# Patient Record
Sex: MALE | Race: WHITE | NOT HISPANIC OR LATINO | Employment: STUDENT | URBAN - METROPOLITAN AREA
[De-identification: names, ages, dates, MRNs, and addresses within clinical notes are randomized per-mention and may not be internally consistent; named-entity substitution may affect disease eponyms.]

---

## 2023-09-13 ENCOUNTER — HOSPITAL ENCOUNTER (OUTPATIENT)
Dept: DATA CONVERSION | Facility: HOSPITAL | Age: 18
Discharge: HOME | End: 2023-09-13
Payer: COMMERCIAL

## 2023-09-13 DIAGNOSIS — S49.90XA UNSPECIFIED INJURY OF SHOULDER AND UPPER ARM, UNSPECIFIED ARM, INITIAL ENCOUNTER: ICD-10-CM

## 2023-09-13 DIAGNOSIS — Z01.818 ENCOUNTER FOR OTHER PREPROCEDURAL EXAMINATION: ICD-10-CM

## 2023-10-05 PROBLEM — R59.9 GLANDS SWOLLEN: Status: ACTIVE | Noted: 2021-10-25

## 2023-10-05 PROBLEM — L23.7 POISON IVY DERMATITIS: Status: ACTIVE | Noted: 2022-10-18

## 2023-10-05 PROBLEM — H61.21 EXCESSIVE CERUMEN IN RIGHT EAR CANAL: Status: ACTIVE | Noted: 2021-05-25

## 2023-10-05 PROBLEM — H10.9 CONJUNCTIVITIS: Status: ACTIVE | Noted: 2021-10-25

## 2023-10-05 PROBLEM — J02.9 SORE THROAT: Status: ACTIVE | Noted: 2023-10-05

## 2023-10-05 PROBLEM — H61.20 EXCESS WAX IN EAR: Status: ACTIVE | Noted: 2021-05-24

## 2023-10-05 PROBLEM — R00.0 TACHYCARDIA: Status: ACTIVE | Noted: 2023-04-06

## 2023-10-05 PROBLEM — F43.22 ADJUSTMENT REACTION WITH ANXIETY: Status: ACTIVE | Noted: 2020-10-19

## 2023-10-05 PROBLEM — J34.2 DEVIATED SEPTUM: Status: ACTIVE | Noted: 2023-10-05

## 2023-10-05 PROBLEM — R05.3 PERSISTENT COUGH: Status: ACTIVE | Noted: 2022-02-07

## 2023-10-05 PROBLEM — J34.89 SINUS PAIN: Status: ACTIVE | Noted: 2023-04-24

## 2023-10-05 PROBLEM — H92.01 EAR PAIN, RIGHT: Status: ACTIVE | Noted: 2021-05-24

## 2023-10-05 PROBLEM — H61.23 BILATERAL IMPACTED CERUMEN: Status: ACTIVE | Noted: 2021-06-15

## 2023-10-05 PROBLEM — R06.2 WHEEZING: Status: ACTIVE | Noted: 2021-12-28

## 2023-10-05 RX ORDER — FINASTERIDE 1 MG/1
1 TABLET, FILM COATED ORAL DAILY
COMMUNITY

## 2023-10-05 RX ORDER — MINOXIDIL 2.5 MG/1
TABLET ORAL
COMMUNITY
Start: 2023-08-03 | End: 2023-10-25 | Stop reason: ALTCHOICE

## 2023-10-11 ENCOUNTER — APPOINTMENT (OUTPATIENT)
Dept: PRIMARY CARE | Facility: CLINIC | Age: 18
End: 2023-10-11
Payer: COMMERCIAL

## 2023-10-25 ENCOUNTER — OFFICE VISIT (OUTPATIENT)
Dept: PEDIATRICS | Facility: CLINIC | Age: 18
End: 2023-10-25
Payer: COMMERCIAL

## 2023-10-25 VITALS
WEIGHT: 151 LBS | DIASTOLIC BLOOD PRESSURE: 70 MMHG | HEIGHT: 69 IN | BODY MASS INDEX: 22.36 KG/M2 | SYSTOLIC BLOOD PRESSURE: 112 MMHG

## 2023-10-25 DIAGNOSIS — R53.83 OTHER FATIGUE: Primary | ICD-10-CM

## 2023-10-25 DIAGNOSIS — Z13.31 DEPRESSION SCREEN: ICD-10-CM

## 2023-10-25 PROCEDURE — 1036F TOBACCO NON-USER: CPT | Performed by: PEDIATRICS

## 2023-10-25 PROCEDURE — 96127 BRIEF EMOTIONAL/BEHAV ASSMT: CPT | Performed by: PEDIATRICS

## 2023-10-25 PROCEDURE — 99395 PREV VISIT EST AGE 18-39: CPT | Performed by: PEDIATRICS

## 2023-10-25 RX ORDER — MINOXIDIL 2 %
SOLUTION, NON-ORAL TOPICAL 2 TIMES DAILY
COMMUNITY

## 2023-10-25 ASSESSMENT — ENCOUNTER SYMPTOMS
CONSTIPATION: 0
DIARRHEA: 0
SLEEP DISTURBANCE: 0

## 2023-10-25 NOTE — PROGRESS NOTES
Subjective   History was provided by the  self .  Kuldip Landis is a 18 y.o. male who is here for this well child visit.  Immunization History   Administered Date(s) Administered    DTaP vaccine, pediatric  (INFANRIX) 2003, 01/08/2004, 03/18/2004, 12/09/2004, 09/10/2008    Hepatitis A vaccine, pediatric/adolescent (HAVRIX, VAQTA) 08/14/2013, 08/06/2014    Hepatitis B vaccine, pediatric/adolescent (RECOMBIVAX, ENGERIX) 2003, 03/18/2004, 06/10/2004    HiB PRP-OMP conjugate vaccine, pediatric (PEDVAXHIB) 2003, 01/08/2004, 03/18/2004, 12/09/2004    Influenza Whole 11/02/2004, 11/08/2005    Influenza, Unspecified 12/09/2004, 11/03/2009, 12/16/2009    Influenza, injectable, quadrivalent 10/04/2018    Influenza, seasonal, injectable 10/18/2007, 10/07/2008, 09/24/2009    MMR vaccine, subcutaneous (MMR II) 12/09/2004, 09/10/2008    Meningococcal ACWY vaccine (MENVEO) 07/14/2021    Meningococcal MCV4P 08/12/2016    Novel influenza-H1N1-09, preservative-free 11/03/2009    Pneumococcal Conjugate PCV 7 2003, 01/08/2004, 06/10/2004, 09/16/2004    Poliovirus vaccine, subcutaneous (IPOL) 2003, 01/08/2004, 03/12/2005, 09/10/2008    Tdap vaccine, age 7 year and older (BOOSTRIX) 08/03/2016    Varicella vaccine, subcutaneous (VARIVAX) 09/15/2005, 09/10/2008   Declines vaccines  Well Child Assessment:    Nutrition  Types of intake include cereals, fruits, meats and vegetables.   Dental  The patient has a dental home. The patient brushes teeth regularly.   Elimination  Elimination problems do not include constipation, diarrhea or urinary symptoms.   Sleep  There are no sleep problems.   School  Child is doing well in school.   Sophomore at [employer] doing well, lives at home- commutes  Denies SA discussed safe sex  Denies smoking vaping and marijuana use  Wears seatbelt in the car, discussed bike helmet  Has not needed to see cardiology  On medication for anxiety and doing well  Complaint of fatigue over  "the past couple months  Gets 6-8 hours per sleep  Eats well not a vegetarian  No family history of thyroid disease Has not had mono before      Objective   Vitals:    10/25/23 1359   BP: 112/70   Weight: 68.5 kg (151 lb)   Height: 1.759 m (5' 9.25\")       Growth parameters are noted and are appropriate for age.  Physical Exam  Constitutional:       Appearance: Normal appearance.   HENT:      Right Ear: Tympanic membrane normal.      Left Ear: Tympanic membrane normal.      Nose: Nose normal.      Mouth/Throat:      Mouth: Mucous membranes are moist.      Pharynx: Oropharynx is clear.   Eyes:      Pupils: Pupils are equal, round, and reactive to light.   Cardiovascular:      Rate and Rhythm: Normal rate and regular rhythm.      Heart sounds: No murmur heard.  Pulmonary:      Effort: Pulmonary effort is normal.      Breath sounds: Normal breath sounds.   Abdominal:      General: Abdomen is flat. Bowel sounds are normal.   Genitourinary:     Penis: Normal.       Testes: Normal.      Comments: No hernia  Musculoskeletal:      Cervical back: Neck supple.      Comments: Normal  Spine straight   Skin:     General: Skin is warm.   Neurological:      General: No focal deficit present.      Mental Status: He is alert.      Gait: Gait normal.      Deep Tendon Reflexes: Reflexes normal.   Psychiatric:         Mood and Affect: Mood normal.         Assessment/Plan   Well adolescent.  1. Anticipatory guidance discussed.  2. Patient will get blood drawn for labs to evaluate fatigue. Our office will call with results  Orders Placed This Encounter   Procedures    CBC    Ankit-Barr Virus Antibody Panel    TSH with reflex to Free T4 if abnormal   3. Follow-up visit in 1 year for next well child visit, or sooner as needed.      "

## 2023-10-30 ENCOUNTER — LAB (OUTPATIENT)
Dept: LAB | Facility: LAB | Age: 18
End: 2023-10-30
Payer: COMMERCIAL

## 2023-10-30 DIAGNOSIS — R53.83 OTHER FATIGUE: ICD-10-CM

## 2023-10-30 LAB
ERYTHROCYTE [DISTWIDTH] IN BLOOD BY AUTOMATED COUNT: 12.2 % (ref 11.5–14.5)
HCT VFR BLD AUTO: 46.3 % (ref 41–52)
HGB BLD-MCNC: 15.6 G/DL (ref 13.5–17.5)
MCH RBC QN AUTO: 30.2 PG (ref 26–34)
MCHC RBC AUTO-ENTMCNC: 33.7 G/DL (ref 32–36)
MCV RBC AUTO: 90 FL (ref 80–100)
NRBC BLD-RTO: 0 /100 WBCS (ref 0–0)
PLATELET # BLD AUTO: 301 X10*3/UL (ref 150–450)
PMV BLD AUTO: 9.9 FL (ref 7.5–11.5)
RBC # BLD AUTO: 5.16 X10*6/UL (ref 4.5–5.9)
TSH SERPL-ACNC: 0.85 MIU/L (ref 0.44–3.98)
WBC # BLD AUTO: 8 X10*3/UL (ref 4.4–11.3)

## 2023-10-30 PROCEDURE — 84443 ASSAY THYROID STIM HORMONE: CPT

## 2023-10-30 PROCEDURE — 89240 UNLISTED MISC PATH TEST: CPT | Performed by: PEDIATRICS

## 2023-10-30 PROCEDURE — 85027 COMPLETE CBC AUTOMATED: CPT

## 2023-10-30 PROCEDURE — 86665 EPSTEIN-BARR CAPSID VCA: CPT

## 2023-10-30 PROCEDURE — 36415 COLL VENOUS BLD VENIPUNCTURE: CPT

## 2023-10-30 PROCEDURE — 86663 EPSTEIN-BARR ANTIBODY: CPT

## 2023-10-30 PROCEDURE — 86664 EPSTEIN-BARR NUCLEAR ANTIGEN: CPT

## 2023-10-31 LAB
EBV EA IGG SER QL: NEGATIVE
EBV NA AB SER QL: POSITIVE
EBV VCA IGG SER IA-ACNC: POSITIVE
EBV VCA IGM SER IA-ACNC: ABNORMAL

## 2023-11-01 ENCOUNTER — TELEPHONE (OUTPATIENT)
Dept: PEDIATRICS | Facility: CLINIC | Age: 18
End: 2023-11-01
Payer: COMMERCIAL

## 2023-11-01 NOTE — TELEPHONE ENCOUNTER
Discussed lab results with Kuldip and recommended continuing supportive care, rest and fluids.  Parent understands plan and has no other questions.  FYI and can sign encounter to close.

## 2023-11-02 ENCOUNTER — PHARMACY VISIT (OUTPATIENT)
Dept: PHARMACY | Facility: CLINIC | Age: 18
End: 2023-11-02
Payer: COMMERCIAL

## 2023-11-02 ENCOUNTER — TELEPHONE (OUTPATIENT)
Dept: PRIMARY CARE | Facility: CLINIC | Age: 18
End: 2023-11-02
Payer: COMMERCIAL

## 2023-11-02 DIAGNOSIS — F41.9 ANXIETY: Primary | ICD-10-CM

## 2023-11-02 PROCEDURE — RXMED WILLOW AMBULATORY MEDICATION CHARGE

## 2023-11-02 RX ORDER — ESCITALOPRAM OXALATE 10 MG/1
10 TABLET ORAL DAILY
Qty: 30 TABLET | Refills: 0 | Status: SHIPPED | OUTPATIENT
Start: 2023-11-02 | End: 2023-11-17 | Stop reason: SDUPTHER

## 2023-11-04 LAB — SCAN RESULT: NORMAL

## 2023-11-09 ENCOUNTER — TELEMEDICINE (OUTPATIENT)
Dept: PRIMARY CARE | Facility: CLINIC | Age: 18
End: 2023-11-09
Payer: COMMERCIAL

## 2023-11-09 DIAGNOSIS — R09.89 CHEST CONGESTION: Primary | ICD-10-CM

## 2023-11-09 PROCEDURE — 99442 PR PHYS/QHP TELEPHONE EVALUATION 11-20 MIN: CPT | Performed by: FAMILY MEDICINE

## 2023-11-09 RX ORDER — AZITHROMYCIN 500 MG/1
500 TABLET, FILM COATED ORAL DAILY
Qty: 6 TABLET | Refills: 0 | Status: SHIPPED | OUTPATIENT
Start: 2023-11-09 | End: 2023-11-15

## 2023-11-09 ASSESSMENT — ENCOUNTER SYMPTOMS
CHILLS: 0
HEADACHES: 0
NAUSEA: 0
DIZZINESS: 0
FEVER: 0
SINUS PRESSURE: 1
COUGH: 1

## 2023-11-09 NOTE — PROGRESS NOTES
Subjective   Patient ID: Kuldip Landis is a 18 y.o. male who presents for No chief complaint on file..  HPI  Pt doing a telephone only televisit for chest congestion and low grade fever  no SOB  Sinuses seem fine    Review of Systems   Constitutional:  Negative for chills and fever.   HENT:  Positive for congestion and sinus pressure.    Respiratory:  Positive for cough.    Gastrointestinal:  Negative for nausea.   Neurological:  Negative for dizziness and headaches.       Objective   Physical Exam  No exam/telehealth  Assessment/Plan   Diagnoses and all orders for this visit:  Chest congestion  -     azithromycin (Zithromax) 500 mg tablet; Take 1 tablet (500 mg) by mouth once daily for 5 days.     Pt visit 11 minutes phone only

## 2023-11-10 ENCOUNTER — APPOINTMENT (OUTPATIENT)
Dept: PRIMARY CARE | Facility: CLINIC | Age: 18
End: 2023-11-10
Payer: COMMERCIAL

## 2023-11-17 ENCOUNTER — OFFICE VISIT (OUTPATIENT)
Dept: PRIMARY CARE | Facility: CLINIC | Age: 18
End: 2023-11-17
Payer: COMMERCIAL

## 2023-11-17 VITALS
SYSTOLIC BLOOD PRESSURE: 122 MMHG | BODY MASS INDEX: 22.36 KG/M2 | HEIGHT: 69 IN | WEIGHT: 151 LBS | DIASTOLIC BLOOD PRESSURE: 64 MMHG

## 2023-11-17 DIAGNOSIS — F43.22 ADJUSTMENT REACTION WITH ANXIETY: ICD-10-CM

## 2023-11-17 PROCEDURE — 99213 OFFICE O/P EST LOW 20 MIN: CPT | Performed by: FAMILY MEDICINE

## 2023-11-17 PROCEDURE — 1036F TOBACCO NON-USER: CPT | Performed by: FAMILY MEDICINE

## 2023-11-17 RX ORDER — ESCITALOPRAM OXALATE 10 MG/1
10 TABLET ORAL DAILY
Qty: 90 TABLET | Refills: 1 | Status: SHIPPED | OUTPATIENT
Start: 2023-11-17 | End: 2024-05-23 | Stop reason: SDUPTHER

## 2023-11-17 ASSESSMENT — ENCOUNTER SYMPTOMS
HEADACHES: 0
WEAKNESS: 0
AGITATION: 0
UNEXPECTED WEIGHT CHANGE: 0
SHORTNESS OF BREATH: 0
COUGH: 0
ABDOMINAL PAIN: 0
CHEST TIGHTNESS: 0
ARTHRALGIAS: 0

## 2023-11-17 ASSESSMENT — PATIENT HEALTH QUESTIONNAIRE - PHQ9
2. FEELING DOWN, DEPRESSED OR HOPELESS: NOT AT ALL
1. LITTLE INTEREST OR PLEASURE IN DOING THINGS: NOT AT ALL
SUM OF ALL RESPONSES TO PHQ9 QUESTIONS 1 AND 2: 0

## 2023-11-17 NOTE — PROGRESS NOTES
Patient is here today to recheck anxiety.  He is currently taking Lexapro 10mg daily and needs this refilled.  He states he is doing well on this dose.

## 2023-11-17 NOTE — PROGRESS NOTES
Subjective   Patient ID: Kuldip Landis is a 18 y.o. male who presents for Anxiety.  Anxiety  Patient reports no chest pain or shortness of breath.         Patient is here today to recheck anxiety.  He is currently taking Lexapro 10mg daily and needs this refilled.  He states he is doing well on this dose.  HPI reviewed/agree  Pt generally feels fine  Meds seem ok  really feels confident on the Lexapro    all good   wants to continue same  no questions   Review of Systems   Constitutional:  Negative for unexpected weight change.   Respiratory:  Negative for cough, chest tightness and shortness of breath.    Cardiovascular:  Negative for chest pain.   Gastrointestinal:  Negative for abdominal pain.   Musculoskeletal:  Negative for arthralgias.   Skin:  Negative for rash.   Neurological:  Negative for weakness and headaches.   Psychiatric/Behavioral:  Negative for agitation.        Objective   Physical Exam  Constitutional:       Appearance: Normal appearance.   HENT:      Head: Normocephalic and atraumatic.      Nose: Nose normal.      Mouth/Throat:      Pharynx: Oropharynx is clear.   Eyes:      Extraocular Movements: Extraocular movements intact.      Pupils: Pupils are equal, round, and reactive to light.   Cardiovascular:      Rate and Rhythm: Normal rate and regular rhythm.      Pulses: Normal pulses.      Heart sounds: Normal heart sounds. No murmur heard.  Pulmonary:      Effort: Pulmonary effort is normal.      Breath sounds: Normal breath sounds.   Abdominal:      General: Abdomen is flat. Bowel sounds are normal.      Palpations: Abdomen is soft.      Tenderness: There is no abdominal tenderness.   Musculoskeletal:         General: Normal range of motion.   Skin:     General: Skin is warm and dry.   Neurological:      General: No focal deficit present.      Mental Status: He is alert and oriented to person, place, and time.   Psychiatric:         Mood and Affect: Mood normal.         Behavior: Behavior  normal.         Assessment/Plan   Diagnoses and all orders for this visit:  Adjustment reaction with anxiety  -     escitalopram (Lexapro) 10 mg tablet; Take 1 tablet (10 mg) by mouth once daily.

## 2023-11-23 ENCOUNTER — PHARMACY VISIT (OUTPATIENT)
Dept: PHARMACY | Facility: CLINIC | Age: 18
End: 2023-11-23
Payer: COMMERCIAL

## 2023-12-08 ENCOUNTER — OFFICE VISIT (OUTPATIENT)
Dept: CARDIOLOGY | Facility: CLINIC | Age: 18
End: 2023-12-08
Payer: COMMERCIAL

## 2023-12-08 VITALS
HEART RATE: 77 BPM | OXYGEN SATURATION: 100 % | BODY MASS INDEX: 22.15 KG/M2 | SYSTOLIC BLOOD PRESSURE: 122 MMHG | WEIGHT: 150 LBS | DIASTOLIC BLOOD PRESSURE: 60 MMHG

## 2023-12-08 DIAGNOSIS — I47.19 AVNRT (AV NODAL RE-ENTRY TACHYCARDIA) (CMS-HCC): ICD-10-CM

## 2023-12-08 DIAGNOSIS — R00.0 TACHYCARDIA: Primary | ICD-10-CM

## 2023-12-08 DIAGNOSIS — R00.2 PALPITATIONS: ICD-10-CM

## 2023-12-08 DIAGNOSIS — I47.10 SVT (SUPRAVENTRICULAR TACHYCARDIA) (CMS-HCC): ICD-10-CM

## 2023-12-08 PROCEDURE — 93005 ELECTROCARDIOGRAM TRACING: CPT | Performed by: INTERNAL MEDICINE

## 2023-12-08 PROCEDURE — 99203 OFFICE O/P NEW LOW 30 MIN: CPT | Performed by: INTERNAL MEDICINE

## 2023-12-08 PROCEDURE — 99213 OFFICE O/P EST LOW 20 MIN: CPT | Performed by: INTERNAL MEDICINE

## 2023-12-08 PROCEDURE — 1036F TOBACCO NON-USER: CPT | Performed by: INTERNAL MEDICINE

## 2023-12-08 PROCEDURE — 93010 ELECTROCARDIOGRAM REPORT: CPT | Performed by: INTERNAL MEDICINE

## 2023-12-08 ASSESSMENT — ENCOUNTER SYMPTOMS
FEVER: 0
WEIGHT LOSS: 0
SYNCOPE: 0
WEIGHT GAIN: 0
PND: 0
DYSPNEA ON EXERTION: 0
DIZZINESS: 0
ORTHOPNEA: 0
WHEEZING: 0
CLAUDICATION: 0
IRREGULAR HEARTBEAT: 0
PALPITATIONS: 1
NEAR-SYNCOPE: 0
WEAKNESS: 0
MYALGIAS: 0
SHORTNESS OF BREATH: 0
COUGH: 0
DIAPHORESIS: 0

## 2023-12-08 ASSESSMENT — PAIN SCALES - GENERAL: PAINLEVEL: 0-NO PAIN

## 2023-12-08 NOTE — PROGRESS NOTES
Subjective      Chief Complaint   Patient presents with    Cardiac Eval- History SVT- Tachy        19 yo with h/o anxiety and SVT presents for cardiac evaluation. He has had 2 ablations, last was in 2019 at [facility]. He reports he has been having issues with palpitations over the last year. He notices these a few times a month. These episodes are associated with nausea/vomiting and feelings of distress.     He is moderately active, exercises about 1x/week. He has never had an episode with exertion.          Review of Systems   Constitutional: Negative for diaphoresis, fever, weight gain and weight loss.   Eyes:  Negative for visual disturbance.   Cardiovascular:  Positive for palpitations. Negative for chest pain, claudication, dyspnea on exertion, irregular heartbeat, leg swelling, near-syncope, orthopnea, paroxysmal nocturnal dyspnea and syncope.   Respiratory:  Negative for cough, shortness of breath and wheezing.    Musculoskeletal:  Negative for muscle weakness and myalgias.   Neurological:  Negative for dizziness and weakness.   All other systems reviewed and are negative.       Past Medical History:   Diagnosis Date    Other conditions influencing health status     Seen by counselor    Personal history of diseases of the skin and subcutaneous tissue 07/14/2021    History of hair disorder    Personal history of other diseases of the circulatory system     History of cardiac arrhythmia    Personal history of other diseases of the respiratory system 03/10/2020    History of sore throat    Personal history of other diseases of the respiratory system 02/03/2020    History of influenza    Personal history of other infectious and parasitic diseases 01/24/2020    History of viral infection    Personal history of other specified conditions     History of medical problems    Personal history of other specified conditions 01/21/2020    History of fever    Personal history of other specified conditions 07/30/2021  "   History of epistaxis    Presence of spectacles and contact lenses     Wears glasses        Past Surgical History:   Procedure Laterality Date    OTHER SURGICAL HISTORY  07/30/2021    Cardiac cath His ablation        Social History     Socioeconomic History    Marital status: Single     Spouse name: Not on file    Number of children: Not on file    Years of education: Not on file    Highest education level: Not on file   Occupational History    Not on file   Tobacco Use    Smoking status: Never     Passive exposure: Never    Smokeless tobacco: Never   Vaping Use    Vaping Use: Never used   Substance and Sexual Activity    Alcohol use: Never    Drug use: Never    Sexual activity: Not on file   Other Topics Concern    Not on file   Social History Narrative    Not on file     Social Determinants of Health     Financial Resource Strain: Not on file   Food Insecurity: Not on file   Transportation Needs: Not on file   Physical Activity: Not on file   Stress: Not on file   Social Connections: Not on file   Intimate Partner Violence: Not on file   Housing Stability: Not on file        Family History   Problem Relation Name Age of Onset    No Known Problems Mother      Hypertension Father          OBJECTIVE:    Vitals:    12/08/23 1042   BP: 122/60   Pulse: 77   SpO2: 100%        Physical Exam     Lab Review:   Lab Results   Component Value Date     01/08/2019    K 3.4 (L) 01/08/2019     01/08/2019    CO2 24 01/08/2019    BUN 14 01/08/2019    CREATININE 0.91 01/08/2019    GLUCOSE 99 01/08/2019    CALCIUM 10.5 01/08/2019     No results found for: \"CHOL\", \"TRIG\", \"HDL\", \"LDLDIRECT\"    No results found for: \"LDLCALC\"     SVT (supraventricular tachycardia)  History of AVNRT with 2 ablations. palpitations occur too infrequent for a 2 week event monitor. Suggested [facility] Kunstad[facility] â€“ [employer] or apple watch. Will get an echocardiogram. Will reassess in 6m       "

## 2023-12-08 NOTE — ASSESSMENT & PLAN NOTE
History of AVNRT with 2 ablations. palpitations occur too infrequent for a 2 week event monitor. Suggested [employer] or apple watch. Will get an echocardiogram. Will reassess in 6m

## 2023-12-09 ENCOUNTER — PHARMACY VISIT (OUTPATIENT)
Dept: PHARMACY | Facility: CLINIC | Age: 18
End: 2023-12-09
Payer: COMMERCIAL

## 2023-12-09 PROCEDURE — RXMED WILLOW AMBULATORY MEDICATION CHARGE

## 2023-12-20 ENCOUNTER — APPOINTMENT (OUTPATIENT)
Dept: PRIMARY CARE | Facility: CLINIC | Age: 18
End: 2023-12-20
Payer: COMMERCIAL

## 2023-12-22 ENCOUNTER — HOSPITAL ENCOUNTER (OUTPATIENT)
Dept: CARDIOLOGY | Facility: HOSPITAL | Age: 18
Discharge: HOME | End: 2023-12-22
Payer: COMMERCIAL

## 2023-12-22 DIAGNOSIS — I47.19 AVNRT (AV NODAL RE-ENTRY TACHYCARDIA) (CMS-HCC): ICD-10-CM

## 2023-12-22 DIAGNOSIS — I47.10 SUPRAVENTRICULAR TACHYCARDIA (CMS-HCC): ICD-10-CM

## 2023-12-22 LAB
AORTIC VALVE MEAN GRADIENT: 3
AORTIC VALVE PEAK VELOCITY: 1.09
AV PEAK GRADIENT: 4.8
AVA (PEAK VEL): 2.86
AVA (VTI): 2.63
EJECTION FRACTION APICAL 4 CHAMBER: 62.5
EJECTION FRACTION: 64
LEFT VENTRICLE INTERNAL DIMENSION DIASTOLE: 4.72 (ref 3.5–6)
LEFT VENTRICULAR OUTFLOW TRACT DIAMETER: 2
MITRAL VALVE E/A RATIO: 3.43
MITRAL VALVE E/E' RATIO: 4.61
RIGHT VENTRICLE FREE WALL PEAK S': 13.3
RIGHT VENTRICLE PEAK SYSTOLIC PRESSURE: 15.8
TRICUSPID ANNULAR PLANE SYSTOLIC EXCURSION: 1.5

## 2023-12-22 PROCEDURE — 93306 TTE W/DOPPLER COMPLETE: CPT

## 2023-12-22 PROCEDURE — 93306 TTE W/DOPPLER COMPLETE: CPT | Performed by: INTERNAL MEDICINE

## 2023-12-29 ENCOUNTER — TELEPHONE (OUTPATIENT)
Dept: CARDIOLOGY | Facility: CLINIC | Age: 18
End: 2023-12-29
Payer: COMMERCIAL

## 2024-01-03 NOTE — TELEPHONE ENCOUNTER
Spoke w patient. Discussed echo, normal. He has some rhythm strips from home monitoring he wants to drop off for review

## 2024-02-15 ENCOUNTER — PHARMACY VISIT (OUTPATIENT)
Dept: PHARMACY | Facility: CLINIC | Age: 19
End: 2024-02-15
Payer: COMMERCIAL

## 2024-02-15 PROCEDURE — RXMED WILLOW AMBULATORY MEDICATION CHARGE

## 2024-02-15 RX ORDER — AZITHROMYCIN 250 MG/1
TABLET, FILM COATED ORAL
Qty: 6 TABLET | Refills: 0 | OUTPATIENT
Start: 2024-02-15

## 2024-02-15 RX ORDER — NIRMATRELVIR AND RITONAVIR 300-100 MG
KIT ORAL
Qty: 30 TABLET | Refills: 0 | OUTPATIENT
Start: 2024-02-15

## 2024-03-06 ENCOUNTER — PHARMACY VISIT (OUTPATIENT)
Dept: PHARMACY | Facility: CLINIC | Age: 19
End: 2024-03-06
Payer: COMMERCIAL

## 2024-03-06 PROCEDURE — RXMED WILLOW AMBULATORY MEDICATION CHARGE

## 2024-03-07 ENCOUNTER — TELEPHONE (OUTPATIENT)
Dept: PEDIATRICS | Facility: CLINIC | Age: 19
End: 2024-03-07
Payer: COMMERCIAL

## 2024-03-07 NOTE — TELEPHONE ENCOUNTER
Spoke w mom and discussed that LO does not prescribe SSRIs. Offered a referral to psychiatry. Mom does not wish for his to go to a psychiatrist. Mom will most likely transfer providers.

## 2024-03-07 NOTE — TELEPHONE ENCOUNTER
Msg from mom. He was started on lexapro for 2 years, it is working well for him. Mom wondering if LO would take over prescribing because other doctor retired.

## 2024-05-03 ENCOUNTER — TELEPHONE (OUTPATIENT)
Dept: PEDIATRICS | Facility: CLINIC | Age: 19
End: 2024-05-03

## 2024-05-03 ENCOUNTER — APPOINTMENT (OUTPATIENT)
Dept: PEDIATRICS | Facility: CLINIC | Age: 19
End: 2024-05-03
Payer: COMMERCIAL

## 2024-05-03 DIAGNOSIS — K62.9 RECTAL ABNORMALITY: ICD-10-CM

## 2024-05-03 NOTE — TELEPHONE ENCOUNTER
Kuldip called back and I discussed MOISES's recommendation to cancel the apt here and for him to see GI and why.  He understands plan and I gave him GI's contact information.   to cancel his apt here today.

## 2024-05-03 NOTE — TELEPHONE ENCOUNTER
Pt on LO's schedule today and she recommends pt be triaged as he may need to be referred.      Spoke to Kuldip and he said that for about a month he's been having some issues with his rectum.  He said it feels like his rectum is clenching/cramping on the inside.  Said it happens randomly and mostly when he sits but sometimes when he stands it has happened too.  He can't pinpoint what triggers it or why it's happening.  He drinks maybe one bottle of water daily, and eats some fiber daily.  He said he has at least one soft bm daily and hasn't seen any blood in his stool, in the toilet water or on the toilet paper.  He said his dad has a history of hemorrhoids.  Told him I'd get back to him w/plan.  Please advise.

## 2024-05-13 ENCOUNTER — PHARMACY VISIT (OUTPATIENT)
Dept: PHARMACY | Facility: CLINIC | Age: 19
End: 2024-05-13
Payer: COMMERCIAL

## 2024-05-13 PROCEDURE — RXMED WILLOW AMBULATORY MEDICATION CHARGE

## 2024-05-23 ENCOUNTER — OFFICE VISIT (OUTPATIENT)
Dept: PRIMARY CARE | Facility: CLINIC | Age: 19
End: 2024-05-23
Payer: COMMERCIAL

## 2024-05-23 VITALS
HEIGHT: 69 IN | OXYGEN SATURATION: 99 % | WEIGHT: 148 LBS | HEART RATE: 78 BPM | BODY MASS INDEX: 21.92 KG/M2 | TEMPERATURE: 97.2 F | SYSTOLIC BLOOD PRESSURE: 100 MMHG | DIASTOLIC BLOOD PRESSURE: 64 MMHG | RESPIRATION RATE: 16 BRPM

## 2024-05-23 DIAGNOSIS — F41.9 ANXIETY DISORDER, UNSPECIFIED TYPE: Primary | ICD-10-CM

## 2024-05-23 DIAGNOSIS — F43.21 GRIEF: ICD-10-CM

## 2024-05-23 PROCEDURE — 99213 OFFICE O/P EST LOW 20 MIN: CPT | Performed by: NURSE PRACTITIONER

## 2024-05-23 PROCEDURE — RXMED WILLOW AMBULATORY MEDICATION CHARGE

## 2024-05-23 RX ORDER — ALPRAZOLAM 0.5 MG/1
0.5 TABLET ORAL NIGHTLY PRN
Qty: 10 TABLET | Refills: 0 | Status: SHIPPED | OUTPATIENT
Start: 2024-05-23 | End: 2024-06-22

## 2024-05-23 RX ORDER — ESCITALOPRAM OXALATE 10 MG/1
10 TABLET ORAL DAILY
Qty: 90 TABLET | Refills: 1 | Status: SHIPPED | OUTPATIENT
Start: 2024-05-23 | End: 2024-11-19

## 2024-05-23 ASSESSMENT — LIFESTYLE VARIABLES
HOW OFTEN DO YOU HAVE A DRINK CONTAINING ALCOHOL: NEVER
SKIP TO QUESTIONS 9-10: 1
HOW OFTEN DO YOU HAVE SIX OR MORE DRINKS ON ONE OCCASION: NEVER
AUDIT-C TOTAL SCORE: 0
HOW MANY STANDARD DRINKS CONTAINING ALCOHOL DO YOU HAVE ON A TYPICAL DAY: PATIENT DOES NOT DRINK

## 2024-05-23 ASSESSMENT — PATIENT HEALTH QUESTIONNAIRE - PHQ9
SUM OF ALL RESPONSES TO PHQ9 QUESTIONS 1 AND 2: 0
1. LITTLE INTEREST OR PLEASURE IN DOING THINGS: NOT AT ALL
2. FEELING DOWN, DEPRESSED OR HOPELESS: NOT AT ALL

## 2024-05-23 ASSESSMENT — PAIN SCALES - GENERAL: PAINLEVEL: 0-NO PAIN

## 2024-05-23 NOTE — PROGRESS NOTES
"Subjective   Patient ID: Kuldip Landis is a 18 y.o. male who presents for Anxiety (New pt from Dr Cruz. Pt here for refill on lexapro for anxiety).    Saw dr shay, new pt. For refillonn meds    Anxiety             Review of Systems   Cardiovascular:         Svt   Hematological:         Anxiety   Psychiatric/Behavioral:          Anxiety        Objective   /64   Pulse 78   Temp 36.2 °C (97.2 °F)   Resp 16   Ht 1.753 m (5' 9\")   Wt 67.1 kg (148 lb)   SpO2 99%   BMI 21.86 kg/m²     Physical Exam  Constitutional:       Appearance: Normal appearance.   Neurological:      Mental Status: He is alert and oriented to person, place, and time.   Psychiatric:      Comments: Father just got dignossed with pancreatic cancer, discussed this hs has history of anxiety but this id  cusung new issues, has a suppotive mom. So sad          Assessment/Plan   Problem List Items Addressed This Visit    None         "

## 2024-05-24 NOTE — PATIENT INSTRUCTIONS
Practice explained, please call if we can help. Julia galvin given, so very sorry for you and your mom, discussed xanax

## 2024-06-02 ENCOUNTER — APPOINTMENT (OUTPATIENT)
Dept: CARDIOLOGY | Facility: HOSPITAL | Age: 19
End: 2024-06-02
Payer: COMMERCIAL

## 2024-06-02 ENCOUNTER — HOSPITAL ENCOUNTER (EMERGENCY)
Facility: HOSPITAL | Age: 19
Discharge: HOME | End: 2024-06-02
Attending: EMERGENCY MEDICINE
Payer: COMMERCIAL

## 2024-06-02 VITALS
TEMPERATURE: 99.3 F | BODY MASS INDEX: 22.22 KG/M2 | HEIGHT: 69 IN | WEIGHT: 150 LBS | SYSTOLIC BLOOD PRESSURE: 127 MMHG | HEART RATE: 86 BPM | DIASTOLIC BLOOD PRESSURE: 75 MMHG | OXYGEN SATURATION: 100 % | RESPIRATION RATE: 14 BRPM

## 2024-06-02 DIAGNOSIS — R00.2 PALPITATIONS: Primary | ICD-10-CM

## 2024-06-02 LAB
ALBUMIN SERPL BCP-MCNC: 5 G/DL (ref 3.4–5)
ALP SERPL-CCNC: 72 U/L (ref 33–120)
ALT SERPL W P-5'-P-CCNC: 17 U/L (ref 10–52)
ANION GAP SERPL CALC-SCNC: 13 MMOL/L (ref 10–20)
AST SERPL W P-5'-P-CCNC: 22 U/L (ref 9–39)
BASOPHILS # BLD AUTO: 0.04 X10*3/UL (ref 0–0.1)
BASOPHILS NFR BLD AUTO: 0.5 %
BILIRUB SERPL-MCNC: 0.6 MG/DL (ref 0–1.2)
BUN SERPL-MCNC: 14 MG/DL (ref 6–23)
CALCIUM SERPL-MCNC: 9.9 MG/DL (ref 8.6–10.3)
CHLORIDE SERPL-SCNC: 101 MMOL/L (ref 98–107)
CO2 SERPL-SCNC: 26 MMOL/L (ref 21–32)
CREAT SERPL-MCNC: 1.38 MG/DL (ref 0.5–1.3)
EGFRCR SERPLBLD CKD-EPI 2021: 75 ML/MIN/1.73M*2
EOSINOPHIL # BLD AUTO: 0.17 X10*3/UL (ref 0–0.7)
EOSINOPHIL NFR BLD AUTO: 2 %
ERYTHROCYTE [DISTWIDTH] IN BLOOD BY AUTOMATED COUNT: 12 % (ref 11.5–14.5)
GLUCOSE SERPL-MCNC: 118 MG/DL (ref 74–99)
HCT VFR BLD AUTO: 45.6 % (ref 41–52)
HGB BLD-MCNC: 15.6 G/DL (ref 13.5–17.5)
IMM GRANULOCYTES # BLD AUTO: 0.03 X10*3/UL (ref 0–0.7)
IMM GRANULOCYTES NFR BLD AUTO: 0.4 % (ref 0–0.9)
LYMPHOCYTES # BLD AUTO: 2.65 X10*3/UL (ref 1.2–4.8)
LYMPHOCYTES NFR BLD AUTO: 31.3 %
MAGNESIUM SERPL-MCNC: 2.05 MG/DL (ref 1.6–2.4)
MCH RBC QN AUTO: 30.1 PG (ref 26–34)
MCHC RBC AUTO-ENTMCNC: 34.2 G/DL (ref 32–36)
MCV RBC AUTO: 88 FL (ref 80–100)
MONOCYTES # BLD AUTO: 0.65 X10*3/UL (ref 0.1–1)
MONOCYTES NFR BLD AUTO: 7.7 %
NEUTROPHILS # BLD AUTO: 4.93 X10*3/UL (ref 1.2–7.7)
NEUTROPHILS NFR BLD AUTO: 58.1 %
NRBC BLD-RTO: 0 /100 WBCS (ref 0–0)
PLATELET # BLD AUTO: 289 X10*3/UL (ref 150–450)
POTASSIUM SERPL-SCNC: 3.2 MMOL/L (ref 3.5–5.3)
PROT SERPL-MCNC: 7.9 G/DL (ref 6.4–8.2)
RBC # BLD AUTO: 5.18 X10*6/UL (ref 4.5–5.9)
SODIUM SERPL-SCNC: 137 MMOL/L (ref 136–145)
TSH SERPL-ACNC: 1 MIU/L (ref 0.44–3.98)
WBC # BLD AUTO: 8.5 X10*3/UL (ref 4.4–11.3)

## 2024-06-02 PROCEDURE — 83735 ASSAY OF MAGNESIUM: CPT | Performed by: EMERGENCY MEDICINE

## 2024-06-02 PROCEDURE — 99283 EMERGENCY DEPT VISIT LOW MDM: CPT | Mod: 25

## 2024-06-02 PROCEDURE — 2500000004 HC RX 250 GENERAL PHARMACY W/ HCPCS (ALT 636 FOR OP/ED): Performed by: EMERGENCY MEDICINE

## 2024-06-02 PROCEDURE — 84443 ASSAY THYROID STIM HORMONE: CPT | Performed by: EMERGENCY MEDICINE

## 2024-06-02 PROCEDURE — 82947 ASSAY GLUCOSE BLOOD QUANT: CPT | Performed by: EMERGENCY MEDICINE

## 2024-06-02 PROCEDURE — 2500000001 HC RX 250 WO HCPCS SELF ADMINISTERED DRUGS (ALT 637 FOR MEDICARE OP): Performed by: EMERGENCY MEDICINE

## 2024-06-02 PROCEDURE — 36415 COLL VENOUS BLD VENIPUNCTURE: CPT | Performed by: EMERGENCY MEDICINE

## 2024-06-02 PROCEDURE — 96360 HYDRATION IV INFUSION INIT: CPT

## 2024-06-02 PROCEDURE — 85025 COMPLETE CBC W/AUTO DIFF WBC: CPT | Performed by: EMERGENCY MEDICINE

## 2024-06-02 PROCEDURE — 93005 ELECTROCARDIOGRAM TRACING: CPT

## 2024-06-02 RX ORDER — POTASSIUM CHLORIDE 1.5 G/1.58G
20 POWDER, FOR SOLUTION ORAL ONCE
Status: COMPLETED | OUTPATIENT
Start: 2024-06-02 | End: 2024-06-02

## 2024-06-02 RX ADMIN — SODIUM CHLORIDE, POTASSIUM CHLORIDE, SODIUM LACTATE AND CALCIUM CHLORIDE 1000 ML: 600; 310; 30; 20 INJECTION, SOLUTION INTRAVENOUS at 21:25

## 2024-06-02 RX ADMIN — POTASSIUM CHLORIDE 20 MEQ: 1.5 POWDER, FOR SOLUTION ORAL at 22:25

## 2024-06-02 ASSESSMENT — LIFESTYLE VARIABLES
TOTAL SCORE: 0
HAVE PEOPLE ANNOYED YOU BY CRITICIZING YOUR DRINKING: NO
EVER HAD A DRINK FIRST THING IN THE MORNING TO STEADY YOUR NERVES TO GET RID OF A HANGOVER: NO
HAVE YOU EVER FELT YOU SHOULD CUT DOWN ON YOUR DRINKING: NO
EVER FELT BAD OR GUILTY ABOUT YOUR DRINKING: NO

## 2024-06-02 ASSESSMENT — PAIN SCALES - GENERAL
PAINLEVEL_OUTOF10: 0 - NO PAIN

## 2024-06-02 ASSESSMENT — PAIN - FUNCTIONAL ASSESSMENT: PAIN_FUNCTIONAL_ASSESSMENT: 0-10

## 2024-06-02 ASSESSMENT — PAIN DESCRIPTION - PROGRESSION: CLINICAL_PROGRESSION: NOT CHANGED

## 2024-06-03 ENCOUNTER — PHARMACY VISIT (OUTPATIENT)
Dept: PHARMACY | Facility: CLINIC | Age: 19
End: 2024-06-03
Payer: COMMERCIAL

## 2024-06-03 NOTE — ED NOTES
Pt came into the ED tonight due to he has been having a fast HR.  He has a HX of SVT and he is anxious that this may be happening again-  He said his first time was when he was 6 years old and then when he was 11-  He has had two ablations done in the past.      Filomena Sanchez RN  06/02/24 0739

## 2024-06-03 NOTE — ED PROVIDER NOTES
HPI   Chief Complaint   Patient presents with    Irregular Heart Beat     Hx of SVT       18-year-old male with a reported history of SVT status post ablation presents to the ED for palpitations.  Symptoms started today.  He does report recent stress in life.  He works as a  as well.  He reports working a lot and in the sun recently.  He believes that he has been well-hydrated.  He was feeling irregular beats we prompted him to present to a fire station and EMS brought him.                           Davey Coma Scale Score: 15                     Patient History   Past Medical History:   Diagnosis Date    Other conditions influencing health status     Seen by counselor    Personal history of diseases of the skin and subcutaneous tissue 07/14/2021    History of hair disorder    Personal history of other diseases of the circulatory system     History of cardiac arrhythmia    Personal history of other diseases of the respiratory system 03/10/2020    History of sore throat    Personal history of other diseases of the respiratory system 02/03/2020    History of influenza    Personal history of other infectious and parasitic diseases 01/24/2020    History of viral infection    Personal history of other specified conditions     History of medical problems    Personal history of other specified conditions 01/21/2020    History of fever    Personal history of other specified conditions 07/30/2021    History of epistaxis    Presence of spectacles and contact lenses     Wears glasses     Past Surgical History:   Procedure Laterality Date    OTHER SURGICAL HISTORY  07/30/2021    Cardiac cath His ablation     Family History   Problem Relation Name Age of Onset    No Known Problems Mother 65     Hypertension Father 86      Social History     Tobacco Use    Smoking status: Never     Passive exposure: Never    Smokeless tobacco: Never   Vaping Use    Vaping status: Never Used   Substance Use Topics    Alcohol  use: Never    Drug use: Never       Physical Exam   ED Triage Vitals [06/02/24 2112]   Temperature Heart Rate Respirations BP   36.7 °C (98.1 °F) (!) 110 18 141/78      Pulse Ox Temp Source Heart Rate Source Patient Position   100 % Temporal Monitor Lying      BP Location FiO2 (%)     Right arm --       Physical Exam  Vitals and nursing note reviewed.   Constitutional:       Appearance: Normal appearance.   HENT:      Head: Normocephalic and atraumatic.   Eyes:      Extraocular Movements: Extraocular movements intact.      Pupils: Pupils are equal, round, and reactive to light.   Cardiovascular:      Rate and Rhythm: Normal rate and regular rhythm.   Pulmonary:      Effort: Pulmonary effort is normal.      Breath sounds: Normal breath sounds.   Abdominal:      General: Abdomen is flat.      Palpations: Abdomen is soft.   Musculoskeletal:         General: Normal range of motion.      Cervical back: Normal range of motion and neck supple.   Skin:     General: Skin is warm and dry.   Neurological:      General: No focal deficit present.      Mental Status: He is alert and oriented to person, place, and time.   Psychiatric:         Mood and Affect: Mood normal.         Behavior: Behavior normal.         Thought Content: Thought content normal.         Judgment: Judgment normal.         ED Course & MDM   Diagnoses as of 06/02/24 2238   Palpitations       Medical Decision Making  EKG:  Sinus rhythm heart rate 108.  No ST segment elevations or depressions.  Normal intervals normal axis.  No prior EKG compare.    18-year-old male presents to the for palpitations.  Has remote history of SVT status post ablations.  Upon arrival to the ED he is in no acute distress.  He is tachycardic.  Is a sinus tachycardia.  Obtained IV access and the patient hydrated with IV fluids.  Also obtain electrolytes.  Mildly elevated creatinine.  Mildly low potassium.  He was given potassium orally.  Magnesium within normal limits.  After IV  hydration he felt improvement in symptoms.  I suspect dehydration.  He has close follow-up with cardiology which I encouraged him to keep.  All questions were answered.        Procedure  Procedures     Alberto Mcguire MD  06/02/24 3681

## 2024-06-09 LAB
ATRIAL RATE: 108 BPM
P AXIS: 81 DEGREES
P OFFSET: 203 MS
P ONSET: 148 MS
PR INTERVAL: 146 MS
Q ONSET: 221 MS
QRS COUNT: 18 BEATS
QRS DURATION: 82 MS
QT INTERVAL: 308 MS
QTC CALCULATION(BAZETT): 412 MS
QTC FREDERICIA: 374 MS
R AXIS: 76 DEGREES
T AXIS: 62 DEGREES
T OFFSET: 375 MS
VENTRICULAR RATE: 108 BPM

## 2024-06-13 ENCOUNTER — OFFICE VISIT (OUTPATIENT)
Dept: CARDIOLOGY | Facility: CLINIC | Age: 19
End: 2024-06-13
Payer: COMMERCIAL

## 2024-06-13 VITALS
OXYGEN SATURATION: 99 % | WEIGHT: 150 LBS | HEART RATE: 87 BPM | BODY MASS INDEX: 22.15 KG/M2 | SYSTOLIC BLOOD PRESSURE: 110 MMHG | DIASTOLIC BLOOD PRESSURE: 62 MMHG

## 2024-06-13 DIAGNOSIS — E86.0 DEHYDRATION: ICD-10-CM

## 2024-06-13 DIAGNOSIS — R00.2 PALPITATIONS: ICD-10-CM

## 2024-06-13 DIAGNOSIS — R00.0 TACHYCARDIA: ICD-10-CM

## 2024-06-13 DIAGNOSIS — I47.10 SVT (SUPRAVENTRICULAR TACHYCARDIA) (CMS-HCC): Primary | ICD-10-CM

## 2024-06-13 PROCEDURE — 1036F TOBACCO NON-USER: CPT | Performed by: INTERNAL MEDICINE

## 2024-06-13 PROCEDURE — 99213 OFFICE O/P EST LOW 20 MIN: CPT | Performed by: INTERNAL MEDICINE

## 2024-06-13 ASSESSMENT — ENCOUNTER SYMPTOMS
SHORTNESS OF BREATH: 0
WEIGHT GAIN: 0
DEPRESSION: 0
WEAKNESS: 0
FEVER: 0
MYALGIAS: 0
IRREGULAR HEARTBEAT: 0
LOSS OF SENSATION IN FEET: 0
PND: 0
WEIGHT LOSS: 0
DIAPHORESIS: 0
DYSPNEA ON EXERTION: 0
WHEEZING: 0
OCCASIONAL FEELINGS OF UNSTEADINESS: 0
COUGH: 0
CLAUDICATION: 0
PALPITATIONS: 0
NEAR-SYNCOPE: 0
DIZZINESS: 0
SYNCOPE: 0
ORTHOPNEA: 0

## 2024-06-13 ASSESSMENT — PAIN SCALES - GENERAL: PAINLEVEL: 0-NO PAIN

## 2024-06-13 ASSESSMENT — PATIENT HEALTH QUESTIONNAIRE - PHQ9
2. FEELING DOWN, DEPRESSED OR HOPELESS: NOT AT ALL
SUM OF ALL RESPONSES TO PHQ9 QUESTIONS 1 AND 2: 0
1. LITTLE INTEREST OR PLEASURE IN DOING THINGS: NOT AT ALL

## 2024-06-13 NOTE — PROGRESS NOTES
"Subjective      Chief Complaint   Patient presents with    Follow-up        18-year-old male with history of SVT and 2 ablations in the past with the most recent being in 2019 at [facility].  I saw him in December for a cardiac evaluation he was having episodes of palpitations accompanied by nausea and vomiting as well as feelings of distress.  There was no exertional component to these symptoms.  At the time of his evaluation in December the episodes were too infrequent to place an event monitor.  We did get an echocardiogram which showed normal left ventricular size and function.  We decided to monitor him clinically he is here for a reassessment. He was In the [facility] ED for \"skipped beats\", was given IV fluids for dehydration. He feels much better, is very active without limitations.          Review of Systems   Constitutional: Negative for diaphoresis, fever, weight gain and weight loss.   Eyes:  Negative for visual disturbance.   Cardiovascular:  Negative for chest pain, claudication, dyspnea on exertion, irregular heartbeat, leg swelling, near-syncope, orthopnea, palpitations, paroxysmal nocturnal dyspnea and syncope.   Respiratory:  Negative for cough, shortness of breath and wheezing.    Musculoskeletal:  Negative for muscle weakness and myalgias.   Neurological:  Negative for dizziness and weakness.   All other systems reviewed and are negative.       Past Medical History:   Diagnosis Date    Other conditions influencing health status     Seen by counselor    Personal history of diseases of the skin and subcutaneous tissue 07/14/2021    History of hair disorder    Personal history of other diseases of the circulatory system     History of cardiac arrhythmia    Personal history of other diseases of the respiratory system 03/10/2020    History of sore throat    Personal history of other diseases of the respiratory system 02/03/2020    History of influenza    Personal history of other infectious " and parasitic diseases 01/24/2020    History of viral infection    Personal history of other specified conditions     History of medical problems    Personal history of other specified conditions 01/21/2020    History of fever    Personal history of other specified conditions 07/30/2021    History of epistaxis    Presence of spectacles and contact lenses     Wears glasses        Past Surgical History:   Procedure Laterality Date    OTHER SURGICAL HISTORY  07/30/2021    Cardiac cath His ablation        Social History     Socioeconomic History    Marital status: Single     Spouse name: Not on file    Number of children: Not on file    Years of education: Not on file    Highest education level: Not on file   Occupational History    Not on file   Tobacco Use    Smoking status: Never     Passive exposure: Never    Smokeless tobacco: Never   Vaping Use    Vaping status: Never Used   Substance and Sexual Activity    Alcohol use: Never    Drug use: Never    Sexual activity: Defer   Other Topics Concern    Not on file   Social History Narrative    At [employer], business     Social Determinants of Health     Financial Resource Strain: Not on file   Food Insecurity: Not on file   Transportation Needs: Not on file   Physical Activity: Not on file   Stress: Not on file   Social Connections: Not on file   Intimate Partner Violence: Not on file   Housing Stability: Not on file        Family History   Problem Relation Name Age of Onset    No Known Problems Mother 65     Hypertension Father 86         OBJECTIVE:    Vitals:    06/13/24 1310   BP: 110/62   Pulse: 87   SpO2: 99%        Vitals reviewed.   Constitutional:       Appearance: Normal and healthy appearance. Not in distress.   Pulmonary:      Effort: Pulmonary effort is normal.      Breath sounds: Normal breath sounds.   Cardiovascular:      Normal rate. Regular rhythm. Normal S1. Normal S2.       Murmurs: There is no murmur.      No gallop.  No click.   Pulses:      "Intact distal pulses.   Edema:     Peripheral edema absent.   Skin:     General: Skin is warm and dry.   Neurological:      General: No focal deficit present.          Lab Review:   Lab Results   Component Value Date     06/02/2024    K 3.2 (L) 06/02/2024     06/02/2024    CO2 26 06/02/2024    BUN 14 06/02/2024    CREATININE 1.38 (H) 06/02/2024    GLUCOSE 118 (H) 06/02/2024    CALCIUM 9.9 06/02/2024     No results found for: \"CHOL\", \"TRIG\", \"HDL\", \"LDLDIRECT\"    No results found for: \"LDLCALC\"     Dehydration  Clinical scenario 6/5 and labs support intravascular volume depletion. He was gien IVFs and encouraged to drink more fluids. Will check BMP.     Palpitations  Resolved. ECGs show sinus rhythm with sinus arrhythmia. Discussed again the option for home monitoring. An event monitor would be low yield. RTC PRN       "

## 2024-06-13 NOTE — ASSESSMENT & PLAN NOTE
Clinical scenario 6/5 and labs support intravascular volume depletion. He was gien IVFs and encouraged to drink more fluids. Will check BMP.

## 2024-06-13 NOTE — ASSESSMENT & PLAN NOTE
Resolved. ECGs show sinus rhythm with sinus arrhythmia. Discussed again the option for home monitoring. An event monitor would be low yield. RTC PRN

## 2024-06-20 ENCOUNTER — APPOINTMENT (OUTPATIENT)
Dept: CARDIOLOGY | Facility: CLINIC | Age: 19
End: 2024-06-20
Payer: COMMERCIAL

## 2024-07-06 PROCEDURE — RXMED WILLOW AMBULATORY MEDICATION CHARGE

## 2024-07-08 ENCOUNTER — PHARMACY VISIT (OUTPATIENT)
Dept: PHARMACY | Facility: CLINIC | Age: 19
End: 2024-07-08
Payer: COMMERCIAL

## 2024-07-17 ENCOUNTER — LAB (OUTPATIENT)
Dept: LAB | Facility: LAB | Age: 19
End: 2024-07-17
Payer: COMMERCIAL

## 2024-07-17 DIAGNOSIS — E86.0 DEHYDRATION: ICD-10-CM

## 2024-07-17 LAB
ANION GAP SERPL CALC-SCNC: 13 MMOL/L
BUN SERPL-MCNC: 11 MG/DL (ref 8–25)
CALCIUM SERPL-MCNC: 10 MG/DL (ref 8.5–10.4)
CHLORIDE SERPL-SCNC: 96 MMOL/L (ref 97–107)
CO2 SERPL-SCNC: 26 MMOL/L (ref 24–31)
CREAT SERPL-MCNC: 1.4 MG/DL (ref 0.4–1.6)
EGFRCR SERPLBLD CKD-EPI 2021: 74 ML/MIN/1.73M*2
GLUCOSE SERPL-MCNC: 76 MG/DL (ref 65–99)
POTASSIUM SERPL-SCNC: 4.7 MMOL/L (ref 3.4–5.1)
SODIUM SERPL-SCNC: 135 MMOL/L (ref 133–145)

## 2024-07-17 PROCEDURE — 36415 COLL VENOUS BLD VENIPUNCTURE: CPT

## 2024-07-17 PROCEDURE — 80048 BASIC METABOLIC PNL TOTAL CA: CPT

## 2024-08-27 ENCOUNTER — OFFICE VISIT (OUTPATIENT)
Dept: PRIMARY CARE | Facility: CLINIC | Age: 19
End: 2024-08-27
Payer: COMMERCIAL

## 2024-08-27 ENCOUNTER — PHARMACY VISIT (OUTPATIENT)
Dept: PHARMACY | Facility: CLINIC | Age: 19
End: 2024-08-27
Payer: COMMERCIAL

## 2024-08-27 VITALS
TEMPERATURE: 98.8 F | HEIGHT: 69 IN | RESPIRATION RATE: 16 BRPM | HEART RATE: 120 BPM | WEIGHT: 148.2 LBS | BODY MASS INDEX: 21.95 KG/M2 | OXYGEN SATURATION: 99 % | SYSTOLIC BLOOD PRESSURE: 120 MMHG | DIASTOLIC BLOOD PRESSURE: 70 MMHG

## 2024-08-27 DIAGNOSIS — J01.90 ACUTE NON-RECURRENT SINUSITIS, UNSPECIFIED LOCATION: Primary | ICD-10-CM

## 2024-08-27 DIAGNOSIS — F41.9 ANXIETY DISORDER, UNSPECIFIED TYPE: ICD-10-CM

## 2024-08-27 DIAGNOSIS — H61.20 WAX IN EAR: ICD-10-CM

## 2024-08-27 DIAGNOSIS — F43.21 GRIEF: ICD-10-CM

## 2024-08-27 PROCEDURE — 3008F BODY MASS INDEX DOCD: CPT | Performed by: NURSE PRACTITIONER

## 2024-08-27 PROCEDURE — RXMED WILLOW AMBULATORY MEDICATION CHARGE

## 2024-08-27 PROCEDURE — 99213 OFFICE O/P EST LOW 20 MIN: CPT | Performed by: NURSE PRACTITIONER

## 2024-08-27 RX ORDER — ESCITALOPRAM OXALATE 10 MG/1
10 TABLET ORAL DAILY
Qty: 90 TABLET | Refills: 1 | Status: SHIPPED | OUTPATIENT
Start: 2024-08-27 | End: 2025-02-23

## 2024-08-27 RX ORDER — AMOXICILLIN AND CLAVULANATE POTASSIUM 875; 125 MG/1; MG/1
875 TABLET, FILM COATED ORAL 2 TIMES DAILY
Qty: 20 TABLET | Refills: 0 | Status: SHIPPED | OUTPATIENT
Start: 2024-08-27 | End: 2024-09-06

## 2024-08-27 RX ORDER — BENZONATATE 200 MG/1
200 CAPSULE ORAL 3 TIMES DAILY PRN
Qty: 42 CAPSULE | Refills: 3 | Status: SHIPPED | OUTPATIENT
Start: 2024-08-27 | End: 2025-02-23

## 2024-08-27 ASSESSMENT — LIFESTYLE VARIABLES
HOW MANY STANDARD DRINKS CONTAINING ALCOHOL DO YOU HAVE ON A TYPICAL DAY: PATIENT DOES NOT DRINK
SKIP TO QUESTIONS 9-10: 1
HOW OFTEN DO YOU HAVE SIX OR MORE DRINKS ON ONE OCCASION: NEVER
HOW OFTEN DO YOU HAVE A DRINK CONTAINING ALCOHOL: NEVER
AUDIT-C TOTAL SCORE: 0

## 2024-08-27 ASSESSMENT — PATIENT HEALTH QUESTIONNAIRE - PHQ9
SUM OF ALL RESPONSES TO PHQ9 QUESTIONS 1 AND 2: 0
2. FEELING DOWN, DEPRESSED OR HOPELESS: NOT AT ALL
1. LITTLE INTEREST OR PLEASURE IN DOING THINGS: NOT AT ALL

## 2024-08-27 NOTE — PROGRESS NOTES
"Subjective   Patient ID: Kuldip Landis is a 18 y.o. male who presents for Cough (Pt c/o cough, body aches, chills, fever, drainage in throat \"water feeling in ears\" since Sunday. ).    Pt has been sick since Sunday  cough congestion fever at [facility] started, dad has stage 4 pacreatic cancer meds helpful         Review of Systems   Constitutional:  Positive for fever.   HENT:  Positive for congestion, facial swelling, sinus pressure and sore throat.    Respiratory:  Positive for cough.    Psychiatric/Behavioral:          Dad has pacreatic cancer stress and concern       Objective   /70   Pulse (!) 120   Temp 37.1 °C (98.8 °F)   Resp 16   Ht 1.753 m (5' 9\")   Wt 67.2 kg (148 lb 3.2 oz)   SpO2 99%   BMI 21.89 kg/m²     Physical Exam  Constitutional:       Appearance: Normal appearance.      Comments: Looks fatigued coughing    HENT:      Right Ear: Tympanic membrane normal.      Left Ear: Tympanic membrane normal.   Cardiovascular:      Rate and Rhythm: Normal rate and regular rhythm.      Pulses: Normal pulses.      Heart sounds: Normal heart sounds.   Pulmonary:      Comments: Deep productive cough brospasm component   Neurological:      Mental Status: He is oriented to person, place, and time.   Psychiatric:         Behavior: Behavior normal.         Assessment/Plan   Problem List Items Addressed This Visit    None  Visit Diagnoses         Codes    Acute non-recurrent sinusitis, unspecified location    -  Primary J01.90    Grief     F43.21    Anxiety disorder, unspecified type     F41.9    Wax in ear     H61.20          Discussed needs to test forcovid stay away from his father medsstarted callif not getting better      "

## 2024-08-27 NOTE — LETTER
August 27, 2024     Patient: Kuldip Landis   YOB: 2005   Date of Visit: 8/27/2024       To Whom It May Concern:    Kuldip Landis was seen in my clinic on 8/27/2024 at 1:15 pm. Please excuse Kuldip for his absence from work on this day to make the appointment. He will be able to return sept 3rd.    If you have any questions or concerns, please don't hesitate to call.         Sincerely,         FLORECITA Ordoñez-CNP        CC: No Recipients

## 2024-08-28 ASSESSMENT — ENCOUNTER SYMPTOMS
COUGH: 1
FACIAL SWELLING: 1
FEVER: 1
SORE THROAT: 1
SINUS PRESSURE: 1

## 2024-09-06 PROCEDURE — RXMED WILLOW AMBULATORY MEDICATION CHARGE

## 2024-09-09 ENCOUNTER — PHARMACY VISIT (OUTPATIENT)
Dept: PHARMACY | Facility: CLINIC | Age: 19
End: 2024-09-09
Payer: COMMERCIAL

## 2024-11-03 PROCEDURE — RXMED WILLOW AMBULATORY MEDICATION CHARGE

## 2024-11-05 ENCOUNTER — PHARMACY VISIT (OUTPATIENT)
Dept: PHARMACY | Facility: CLINIC | Age: 19
End: 2024-11-05
Payer: COMMERCIAL

## 2024-11-18 ENCOUNTER — OFFICE VISIT (OUTPATIENT)
Dept: PRIMARY CARE | Facility: CLINIC | Age: 19
End: 2024-11-18
Payer: COMMERCIAL

## 2024-11-18 VITALS
RESPIRATION RATE: 17 BRPM | OXYGEN SATURATION: 98 % | SYSTOLIC BLOOD PRESSURE: 116 MMHG | HEART RATE: 82 BPM | DIASTOLIC BLOOD PRESSURE: 72 MMHG

## 2024-11-18 DIAGNOSIS — R30.9 PAIN WITH URINATION: ICD-10-CM

## 2024-11-18 DIAGNOSIS — R20.8 BURNING SENSATION: Primary | ICD-10-CM

## 2024-11-18 DIAGNOSIS — R10.30 LOWER ABDOMINAL PAIN: ICD-10-CM

## 2024-11-18 LAB
POC APPEARANCE, URINE: CLEAR
POC BILIRUBIN, URINE: NEGATIVE
POC BLOOD, URINE: NEGATIVE
POC COLOR, URINE: YELLOW
POC GLUCOSE, URINE: NEGATIVE MG/DL
POC KETONES, URINE: NEGATIVE MG/DL
POC LEUKOCYTES, URINE: NEGATIVE
POC NITRITE,URINE: NEGATIVE
POC PH, URINE: 6.5 PH
POC PROTEIN, URINE: NEGATIVE MG/DL
POC SPECIFIC GRAVITY, URINE: 1.01
POC UROBILINOGEN, URINE: 0.2 EU/DL

## 2024-11-18 PROCEDURE — 81003 URINALYSIS AUTO W/O SCOPE: CPT | Performed by: NURSE PRACTITIONER

## 2024-11-18 PROCEDURE — 99213 OFFICE O/P EST LOW 20 MIN: CPT | Performed by: NURSE PRACTITIONER

## 2024-11-18 ASSESSMENT — ENCOUNTER SYMPTOMS
DEPRESSION: 0
LOSS OF SENSATION IN FEET: 0
OCCASIONAL FEELINGS OF UNSTEADINESS: 0

## 2024-11-18 ASSESSMENT — PATIENT HEALTH QUESTIONNAIRE - PHQ9
1. LITTLE INTEREST OR PLEASURE IN DOING THINGS: NOT AT ALL
2. FEELING DOWN, DEPRESSED OR HOPELESS: NOT AT ALL
SUM OF ALL RESPONSES TO PHQ9 QUESTIONS 1 AND 2: 0

## 2024-11-18 ASSESSMENT — PAIN SCALES - GENERAL: PAINLEVEL_OUTOF10: 0-NO PAIN

## 2024-11-20 ASSESSMENT — ENCOUNTER SYMPTOMS: ABDOMINAL PAIN: 1

## 2024-11-20 NOTE — PROGRESS NOTES
Subjective   Patient ID: Kuldip Landis is a 19 y.o. male who presents for Burning urination (Patient here for painful urination. Recalls this happening for years, sx's come and go ).    HPI     Review of Systems   Gastrointestinal:  Positive for abdominal pain.   Genitourinary:  Positive for urgency.       Objective   /72 (BP Location: Right arm, Patient Position: Sitting, BP Cuff Size: Adult)   Pulse 82   Resp 17   SpO2 98%     Physical Exam  Constitutional:       General: He is not in acute distress.     Appearance: Normal appearance.   Cardiovascular:      Rate and Rhythm: Normal rate and regular rhythm.      Heart sounds: No murmur heard.  Pulmonary:      Breath sounds: Normal breath sounds. No wheezing.   Abdominal:      General: Bowel sounds are normal.      Palpations: Abdomen is soft.   Neurological:      Mental Status: He is alert.         Assessment/Plan   Problem List Items Addressed This Visit    None  Visit Diagnoses         Codes    Burning sensation    -  Primary R20.8    Relevant Orders    POCT UA Automated manually resulted (Completed)    Urine Culture    Referral to Urology    US bladder    C. trachomatis / N. gonorrhoeae, Amplified    Pain with urination     R30.9    Relevant Orders    POCT UA Automated manually resulted (Completed)    Urine Culture    Referral to Urology    US bladder    C. trachomatis / N. gonorrhoeae, Amplified    Lower abdominal pain     R10.30    Relevant Orders    POCT UA Automated manually resulted (Completed)    Urine Culture    Referral to Urology    US bladder    C. trachomatis / N. gonorrhoeae, Amplified

## 2024-11-22 ENCOUNTER — HOSPITAL ENCOUNTER (OUTPATIENT)
Dept: RADIOLOGY | Facility: HOSPITAL | Age: 19
Discharge: HOME | End: 2024-11-22
Payer: COMMERCIAL

## 2024-11-22 DIAGNOSIS — R10.30 LOWER ABDOMINAL PAIN: ICD-10-CM

## 2024-11-22 DIAGNOSIS — R20.8 BURNING SENSATION: ICD-10-CM

## 2024-11-22 DIAGNOSIS — R30.9 PAIN WITH URINATION: ICD-10-CM

## 2024-11-22 PROCEDURE — 76857 US EXAM PELVIC LIMITED: CPT

## 2024-11-29 ENCOUNTER — PHARMACY VISIT (OUTPATIENT)
Dept: PHARMACY | Facility: CLINIC | Age: 19
End: 2024-11-29
Payer: COMMERCIAL

## 2024-11-29 PROCEDURE — RXMED WILLOW AMBULATORY MEDICATION CHARGE

## 2024-12-13 ENCOUNTER — APPOINTMENT (OUTPATIENT)
Dept: GENETICS | Facility: CLINIC | Age: 19
End: 2024-12-13
Payer: COMMERCIAL

## 2024-12-20 ENCOUNTER — APPOINTMENT (OUTPATIENT)
Dept: GENETICS | Facility: CLINIC | Age: 19
End: 2024-12-20
Payer: COMMERCIAL

## 2024-12-20 DIAGNOSIS — Z80.0 FAMILY HISTORY OF PANCREATIC CANCER: Primary | ICD-10-CM

## 2024-12-20 PROCEDURE — 99205 OFFICE O/P NEW HI 60 MIN: CPT | Performed by: INTERNAL MEDICINE

## 2024-12-20 NOTE — PROGRESS NOTES
MEDICAL GENETICS INITIAL VISIT NOTE    Patient full name: Kuldip Landis  MRN: [id number]  YOB: 2005  Present during this visit: The patient     Primary care provider: TARSHA Ordoñez    Medical history was obtained from the patient. Prior to this appointment, I reviewed medical records from outpatient medical records and scanned documents, if available. This visit was completed via televideo. All issues as below were discussed and addressed but no physical exam was performed. If it was felt that the patient should be evaluated in clinic then they were directed there. The patient consented to visit.    History of present illness:    It was a pleasure to see Mr. Landis in clinic today. Mr. Landis is a 19 y.o. male who is here for evaluation of hereditary predisposition to cancer. He is healthy with PMH notable for SVT s/p ablations. PSH includes cardiac ablations and wisdom teeth extraction. He has no personal history of tumors, precancerous findings, or cancer.     His father was diagnosed with metastatic pancreatic cancer in May this year. His father's oncologist recommended that Mr. Landis undergo genetic testing. The father has never had genetic testing according to his mother.     Cancer screening history:  Colonoscopy: No   Upper endoscopy: No   Precancerous skin lesions or moles that are being followed by Derm: No   PSA testing: N/A     Social history:   Studying business in college. Denies smoking and drinking. Lives with parents.     Family history:  A four-generation family tree was obtained and scanned to chart.  Pertinent history   Father recently dx with metastatic pancreatic cancer. No genetic testing per mother.   Half sister (d., 50s) adrenal cancer.   Mom, BCC, COPD  Mat aunt, BCC  MGF (d) dx with esophageal and stomach cancer in late 60s.    Paternal side: Sami  Maternal side: Shabnam, Polish  Ashkenazi Orthodoxy: Denied  Consanguinity: Denied    Physical examination:  No  physical exam was performed. This is a virtual visit.     Assessment:  Mr. Landis is a 19 y.o. male with FH notable for pancreatic cancer in father, BCC in mom and mat aunt, and esophageal and gastric cancer in MGF.    Pancreatic cancer is common with a lifetime risk of 1 in 64 in general population. We discussed genetics of pancreatic cancer. Most of the cases are multifactorial (polygenic and environmental) in origin, while 10%-15% are caused by single gene mutations. Identification of individuals harboring the genetic variant is beneficial since we could provide further medical recommendations as well as obtain familial cascade testing. For example, individuals with pathogenic variants in BRCA1 or BRCA2 have significantly increased risk of breast cancer to 60%-70% by 70 years of age, in addition to risk of other cancers such as ovarian cancer. There is also a role of prophylactic surgery, PARP inhibitor, as well as enrolment in various clinical trials. Asymptomatic individuals with pancreatic cancer gene mutations (e.g., BRCA1/2, PALB2, TRINO) may also need pancreatic cancer screening based on their family history.     Although Mr. Landis meets the genetic testing criteria per NCCN, I strongly recommend that his father undergo genetic testing first. His mother informed him that the father has not had genetic testing.   - If genetic testing is positive in father, familial variant testing is indicated in other family members and Mr. Landis.  - If genetic testing is negative in father, there is no genetic testing indication in Mr. Landis.    The reason that we do not test an asymptomatic individual is that the negative result is uninformative. We would not know if it is negative because Mr. Landis does not inherit the familial variant, if any (true negative) or if the test fails to detect the familial variant (we do not know what to be looking for; false negative). Thus, a negative test does not provide a  100% reassurance that Mr. Landis has not inherited the familial variant, if any.     Furthermore, testing Mr. Landis without knowing his father's mutation, if any, would potentially result in uncertain findings.     We discussed Genetic Information Non-discrimination Act (EFRAÍN), which is a federal law that inhibits employer and health insurance to make decision based on genetic information. There is also another layer of protection from state law. It however does not apply to life insurance, nursing home insurance, and disability insurance. Having a genetic variant that predisposes to cancers in an asymptomatic individual may have insurability implications. If his father's test is negative, we do not need to test Mr. Landis which may result in insurability implications.     If his father's test is negative, I reviewed that Mr. Landis has theoretically increased pancreatic cancer risk based on family history alone. However, having one first-degree relative with pancreatic cancer does not warrant pancreatic cancer screening (MRI + EUS) per the NCCN guidelines. I recommend increased awareness and reaching out to physicians if concerning symptoms develop. There is also a self-pay option for pancreatic cancer screening using FAST MRI, available for $199, for individuals aged 25 to 75 who have a first-degree relative with pancreatic cancer.     Lastly, there is no genetic testing indication in the maternal side. Although the MGF had esophageal cancer and stomach cancer, both cancers were diagnosed in his 60s. He did not meet genetic testing criteria per the NCCN. There is reported history of alcohol use, which is one of the known risk factors for these cancers.     Plan:  - If testing father is possible, I do not recommend testing Mr. Landis today given several limitations and a possibility of uninformative negative results, as described above.   - I recommend that his father undergo genetic testing first:  - If  it has already been done, I need to review the test report.   - If positive, a familial variant test is indicated in Mr. Landis.  - If negative, there is no genetic testing indicated in Mr. Landis and his family members. However, a self-pay option of the MRI of the pancreas could be considered between age 25 to 75. Information sent via email.   -If his father is interested in seeing me, I instructed Mr. Landis to reach out to me.   - If testing his father is not possible, testing Mr. Landis is not unreasonable.   - I recommend that he keep me informed regarding new diagnosis of cancer in his family, since my recommendations today are based on personal history and family history at present.     Janelle Khan MD    Medical Geneticist  [facility]  Phone: [phone]  Fax: [phone]   Address: [address] [place] [address]  Time spent (this information is required by insurance): face-to-face 50min; preparation 5min; documentation 20min; total time spent 75min

## 2024-12-28 ENCOUNTER — APPOINTMENT (OUTPATIENT)
Dept: CARDIOLOGY | Facility: HOSPITAL | Age: 19
End: 2024-12-28
Payer: COMMERCIAL

## 2024-12-28 ENCOUNTER — HOSPITAL ENCOUNTER (EMERGENCY)
Facility: HOSPITAL | Age: 19
Discharge: HOME | End: 2024-12-28
Attending: STUDENT IN AN ORGANIZED HEALTH CARE EDUCATION/TRAINING PROGRAM
Payer: COMMERCIAL

## 2024-12-28 VITALS
HEIGHT: 69 IN | DIASTOLIC BLOOD PRESSURE: 75 MMHG | HEART RATE: 81 BPM | TEMPERATURE: 98.4 F | RESPIRATION RATE: 16 BRPM | WEIGHT: 149.69 LBS | SYSTOLIC BLOOD PRESSURE: 130 MMHG | OXYGEN SATURATION: 100 % | BODY MASS INDEX: 22.17 KG/M2

## 2024-12-28 DIAGNOSIS — R00.0 TACHYCARDIA: Primary | ICD-10-CM

## 2024-12-28 DIAGNOSIS — Z86.79 HISTORY OF PSVT (PAROXYSMAL SUPRAVENTRICULAR TACHYCARDIA): ICD-10-CM

## 2024-12-28 LAB
ALBUMIN SERPL BCP-MCNC: 4.9 G/DL (ref 3.4–5)
ALP SERPL-CCNC: 61 U/L (ref 33–120)
ALT SERPL W P-5'-P-CCNC: 16 U/L (ref 10–52)
ANION GAP SERPL CALCULATED.3IONS-SCNC: 14 MMOL/L (ref 10–20)
AST SERPL W P-5'-P-CCNC: 19 U/L (ref 9–39)
BASOPHILS # BLD AUTO: 0.03 X10*3/UL (ref 0–0.1)
BASOPHILS NFR BLD AUTO: 0.4 %
BILIRUB SERPL-MCNC: 0.6 MG/DL (ref 0–1.2)
BUN SERPL-MCNC: 11 MG/DL (ref 6–23)
CALCIUM SERPL-MCNC: 10.3 MG/DL (ref 8.6–10.3)
CARDIAC TROPONIN I PNL SERPL HS: <3 NG/L (ref 0–20)
CHLORIDE SERPL-SCNC: 103 MMOL/L (ref 98–107)
CO2 SERPL-SCNC: 26 MMOL/L (ref 21–32)
CREAT SERPL-MCNC: 1.18 MG/DL (ref 0.5–1.3)
EGFRCR SERPLBLD CKD-EPI 2021: 90 ML/MIN/1.73M*2
EOSINOPHIL # BLD AUTO: 0.14 X10*3/UL (ref 0–0.7)
EOSINOPHIL NFR BLD AUTO: 2 %
ERYTHROCYTE [DISTWIDTH] IN BLOOD BY AUTOMATED COUNT: 11.9 % (ref 11.5–14.5)
GLUCOSE SERPL-MCNC: 112 MG/DL (ref 74–99)
HCT VFR BLD AUTO: 44.1 % (ref 41–52)
HGB BLD-MCNC: 15.3 G/DL (ref 13.5–17.5)
IMM GRANULOCYTES # BLD AUTO: 0.02 X10*3/UL (ref 0–0.7)
IMM GRANULOCYTES NFR BLD AUTO: 0.3 % (ref 0–0.9)
LYMPHOCYTES # BLD AUTO: 2.12 X10*3/UL (ref 1.2–4.8)
LYMPHOCYTES NFR BLD AUTO: 30.9 %
MAGNESIUM SERPL-MCNC: 2.15 MG/DL (ref 1.6–2.4)
MCH RBC QN AUTO: 29.7 PG (ref 26–34)
MCHC RBC AUTO-ENTMCNC: 34.7 G/DL (ref 32–36)
MCV RBC AUTO: 86 FL (ref 80–100)
MONOCYTES # BLD AUTO: 0.48 X10*3/UL (ref 0.1–1)
MONOCYTES NFR BLD AUTO: 7 %
NEUTROPHILS # BLD AUTO: 4.06 X10*3/UL (ref 1.2–7.7)
NEUTROPHILS NFR BLD AUTO: 59.4 %
NRBC BLD-RTO: 0 /100 WBCS (ref 0–0)
PLATELET # BLD AUTO: 300 X10*3/UL (ref 150–450)
POTASSIUM SERPL-SCNC: 3.5 MMOL/L (ref 3.5–5.3)
PROT SERPL-MCNC: 7.9 G/DL (ref 6.4–8.2)
RBC # BLD AUTO: 5.16 X10*6/UL (ref 4.5–5.9)
SODIUM SERPL-SCNC: 139 MMOL/L (ref 136–145)
WBC # BLD AUTO: 6.9 X10*3/UL (ref 4.4–11.3)

## 2024-12-28 PROCEDURE — 99284 EMERGENCY DEPT VISIT MOD MDM: CPT | Performed by: STUDENT IN AN ORGANIZED HEALTH CARE EDUCATION/TRAINING PROGRAM

## 2024-12-28 PROCEDURE — 85025 COMPLETE CBC W/AUTO DIFF WBC: CPT | Performed by: STUDENT IN AN ORGANIZED HEALTH CARE EDUCATION/TRAINING PROGRAM

## 2024-12-28 PROCEDURE — 84484 ASSAY OF TROPONIN QUANT: CPT | Performed by: STUDENT IN AN ORGANIZED HEALTH CARE EDUCATION/TRAINING PROGRAM

## 2024-12-28 PROCEDURE — 36415 COLL VENOUS BLD VENIPUNCTURE: CPT | Performed by: STUDENT IN AN ORGANIZED HEALTH CARE EDUCATION/TRAINING PROGRAM

## 2024-12-28 PROCEDURE — 83735 ASSAY OF MAGNESIUM: CPT | Performed by: STUDENT IN AN ORGANIZED HEALTH CARE EDUCATION/TRAINING PROGRAM

## 2024-12-28 PROCEDURE — 80053 COMPREHEN METABOLIC PANEL: CPT | Performed by: STUDENT IN AN ORGANIZED HEALTH CARE EDUCATION/TRAINING PROGRAM

## 2024-12-28 PROCEDURE — 93005 ELECTROCARDIOGRAM TRACING: CPT

## 2024-12-28 ASSESSMENT — COLUMBIA-SUICIDE SEVERITY RATING SCALE - C-SSRS
1. IN THE PAST MONTH, HAVE YOU WISHED YOU WERE DEAD OR WISHED YOU COULD GO TO SLEEP AND NOT WAKE UP?: NO
6. HAVE YOU EVER DONE ANYTHING, STARTED TO DO ANYTHING, OR PREPARED TO DO ANYTHING TO END YOUR LIFE?: NO
2. HAVE YOU ACTUALLY HAD ANY THOUGHTS OF KILLING YOURSELF?: NO

## 2024-12-28 ASSESSMENT — PAIN SCALES - GENERAL
PAINLEVEL_OUTOF10: 0 - NO PAIN
PAINLEVEL_OUTOF10: 0 - NO PAIN

## 2024-12-28 ASSESSMENT — PAIN - FUNCTIONAL ASSESSMENT: PAIN_FUNCTIONAL_ASSESSMENT: 0-10

## 2025-01-02 ENCOUNTER — TELEPHONE (OUTPATIENT)
Dept: GENETICS | Facility: CLINIC | Age: 20
End: 2025-01-02
Payer: COMMERCIAL

## 2025-01-02 LAB
ATRIAL RATE: 132 BPM
P AXIS: 69 DEGREES
P OFFSET: 201 MS
P ONSET: 146 MS
PR INTERVAL: 146 MS
Q ONSET: 219 MS
QRS COUNT: 21 BEATS
QRS DURATION: 80 MS
QT INTERVAL: 302 MS
QTC CALCULATION(BAZETT): 447 MS
QTC FREDERICIA: 393 MS
R AXIS: 96 DEGREES
T AXIS: 11 DEGREES
T OFFSET: 370 MS
VENTRICULAR RATE: 132 BPM

## 2025-01-02 NOTE — TELEPHONE ENCOUNTER
Genetics note:    Called, left a VM, and sent him a "[employer] message. I have not heard back from him re: evaluating his father.     Janelle Khan MD  Medical Geneticist

## 2025-01-09 ENCOUNTER — APPOINTMENT (OUTPATIENT)
Dept: PRIMARY CARE | Facility: CLINIC | Age: 20
End: 2025-01-09
Payer: COMMERCIAL

## 2025-01-13 NOTE — ED PROVIDER NOTES
History of Present Illness     History provided by: Patient and Parent  Limitations to History: None  External Records Reviewed with Brief Summary:  Prior outpatient notes    HPI:  Kuldip Landis is a 19 y.o. male with a past medical history of SVT presents with tachycardia.  Patient states he previously was diagnosed SVT and is status post ablation.  He states today while in the car with his friends he felt his heart racing.  Feel well after it spontaneously resolved and he went home.  While laying in bed, he had another episode which she states felt almost identical to his prior episodes of SVT.  Does not have an Apple Watch, did not check his heart rate so was unsure exactly how fast his heart was beating.  Did not pass out.  Associated nausea.    Physical Exam   Triage vitals:  T 36.9 °C (98.4 °F)  HR (!) 126  /81  RR 16  O2 100 % None (Room air)    Physical Exam  Vitals and nursing note reviewed.   Constitutional:       General: He is not in acute distress.     Appearance: He is not ill-appearing.   HENT:      Head: Normocephalic and atraumatic.      Mouth/Throat:      Mouth: Mucous membranes are moist.      Pharynx: Oropharynx is clear.   Eyes:      Extraocular Movements: Extraocular movements intact.      Conjunctiva/sclera: Conjunctivae normal.      Pupils: Pupils are equal, round, and reactive to light.   Cardiovascular:      Rate and Rhythm: Regular rhythm. Tachycardia present.   Pulmonary:      Effort: Pulmonary effort is normal. No respiratory distress.      Breath sounds: Normal breath sounds.   Abdominal:      General: There is no distension.      Palpations: Abdomen is soft.      Tenderness: There is no abdominal tenderness.   Musculoskeletal:         General: No swelling or deformity. Normal range of motion.      Cervical back: Normal range of motion and neck supple.   Skin:     General: Skin is warm and dry.      Capillary Refill: Capillary refill takes less than 2 seconds.   Neurological:       General: No focal deficit present.      Mental Status: He is alert and oriented to person, place, and time. Mental status is at baseline.   Psychiatric:         Mood and Affect: Mood normal.         Behavior: Behavior normal.          Medical Decision Making & ED Course   Medical Decision Makin y.o. male who presents with rapid heart rate, found on EKG to have sinus tachycardia, without AMS, without chest pain, without hypotension.  I have considered the following conditions during my assessment of this patient's conditions:  atrial fibrillation, atrial flutter, GI bleed, hypokalemia, hypokalemia, hypomagnesemia, myocardial infarction acute, WPW, HOCM, PSVT, PVCs, pulmonary embolus, sepsis, thyrotoxicosis, ventricular tachycardia, torsades the points, dehydration.  Patient not in SVT, likely did experience SVT at home.  Electrolytes within normal limits, renal function normal.  No anemia.  Patient no longer tachycardic without intervention.  At this time is stable, with normal vitals.  Referral was placed for electrophysiology as patient has not followed up for his SVT in some time.  No further indication for urgent/emergent workup or management and is appropriate for d/c home.    ----  Social Determinants of Health which Significantly Impact Care: None identified     EKG Independent Interpretation: EKG interpreted by myself. Please see ED Course for full interpretation.    Independent Result Review and Interpretation: Relevant laboratory and radiographic results were reviewed and independently interpreted by myself.  As necessary, they are commented on in the ED Course.    Chronic conditions affecting the patient's care: As documented above in [facility]    The patient was discussed with the following consultants/services: None    Care Considerations: As documented above in [facility]    ED Course:  ED Course as of 25 2312   Sat Dec 28, 2024   1835 ECG 12 lead  Performed at 1818, HR of 132, NSR, NAD, QTc 447, no  sign of STEMI, no Q wave or T wave abnormality noted.    Reviewed and interpreted by me at time performed   [JM]      ED Course User Index  [JM] Erika Mane MD         Diagnoses as of 01/12/25 2312   Tachycardia   History of PSVT (paroxysmal supraventricular tachycardia)       Procedures   Procedures    Erika Mane MD  Emergency Medicine     Erika Mane MD  01/13/25 0042

## 2025-01-22 ENCOUNTER — OFFICE VISIT (OUTPATIENT)
Dept: PRIMARY CARE | Facility: CLINIC | Age: 20
End: 2025-01-22
Payer: COMMERCIAL

## 2025-01-22 VITALS
OXYGEN SATURATION: 99 % | RESPIRATION RATE: 18 BRPM | DIASTOLIC BLOOD PRESSURE: 62 MMHG | WEIGHT: 150 LBS | SYSTOLIC BLOOD PRESSURE: 110 MMHG | BODY MASS INDEX: 22.22 KG/M2 | TEMPERATURE: 97.7 F | HEIGHT: 69 IN | HEART RATE: 87 BPM

## 2025-01-22 DIAGNOSIS — F41.0 PANIC ATTACKS: ICD-10-CM

## 2025-01-22 DIAGNOSIS — F43.21 GRIEF: ICD-10-CM

## 2025-01-22 DIAGNOSIS — F41.9 ANXIETY: ICD-10-CM

## 2025-01-22 DIAGNOSIS — I47.10 PAROXYSMAL SUPRAVENTRICULAR TACHYCARDIA (CMS-HCC): Primary | ICD-10-CM

## 2025-01-22 PROCEDURE — 3008F BODY MASS INDEX DOCD: CPT | Performed by: NURSE PRACTITIONER

## 2025-01-22 PROCEDURE — 1036F TOBACCO NON-USER: CPT | Performed by: NURSE PRACTITIONER

## 2025-01-22 PROCEDURE — 99214 OFFICE O/P EST MOD 30 MIN: CPT | Performed by: NURSE PRACTITIONER

## 2025-01-22 RX ORDER — ESCITALOPRAM OXALATE 20 MG/1
20 TABLET ORAL DAILY
Qty: 90 TABLET | Refills: 1 | Status: SHIPPED | OUTPATIENT
Start: 2025-01-22 | End: 2025-07-21

## 2025-01-22 ASSESSMENT — PAIN SCALES - GENERAL: PAINLEVEL_OUTOF10: 0-NO PAIN

## 2025-01-22 ASSESSMENT — PATIENT HEALTH QUESTIONNAIRE - PHQ9
1. LITTLE INTEREST OR PLEASURE IN DOING THINGS: NOT AT ALL
SUM OF ALL RESPONSES TO PHQ9 QUESTIONS 1 AND 2: 0
2. FEELING DOWN, DEPRESSED OR HOPELESS: NOT AT ALL

## 2025-01-22 ASSESSMENT — ENCOUNTER SYMPTOMS
NAUSEA: 1
PALPITATIONS: 1

## 2025-01-22 NOTE — PROGRESS NOTES
"Subjective   Patient ID: Kuldip Landis is a 19 y.o. male who presents for ER Follow-up (PT IS HERE FOR TRIPOINT ER FOLLOW UP FROM 12/28/24 FOR TACHYCARDIA AND POSSIBLE SVT. ).    HERE FOR A FOLLOW UP OF ER FOR PSVT, HAS HAD THIS BEFORE SAW DR HUMPHRIES AT THAT TIME WAS THOUGHT DEHYDRATION PLAYED A PART, HAS HAS SOME MORE RECENT PANIC ATTACKS, UNDER STRESS DAD IS GOING THROUGH CHEMO THERAPY, SCHOOL ETC, , HAS HAD COUNSELING M DOES THE BREATHING EXERCISES ETC    ER Follow-up  This is a new problem. The problem occurs intermittently. The problem has been waxing and waning. Associated symptoms include nausea. The symptoms are aggravated by stress. The treatment provided mild relief.        Review of Systems   Cardiovascular:  Positive for palpitations.   Gastrointestinal:  Positive for nausea.   Psychiatric/Behavioral:          PANIC ATTACKS STRESS ANXIETY       Objective   /62   Pulse 87   Temp 36.5 °C (97.7 °F)   Resp 18   Ht 1.753 m (5' 9\")   Wt 68 kg (150 lb)   SpO2 99%   BMI 22.15 kg/m²     Physical Exam  Constitutional:       Appearance: Normal appearance.   HENT:      Right Ear: Tympanic membrane normal.      Left Ear: Tympanic membrane normal.   Cardiovascular:      Rate and Rhythm: Normal rate and regular rhythm.      Pulses: Normal pulses.      Heart sounds: Normal heart sounds.   Pulmonary:      Effort: Pulmonary effort is normal.      Breath sounds: Normal breath sounds.   Abdominal:      General: Bowel sounds are normal.   Neurological:      Mental Status: He is alert.   Psychiatric:      Comments: LONG DISCUSSION ABOUT WHICH HAPPENS FIRST ANXIETY THEN TACHY OR TACHY THEN PANIC ATTACK WAS INVITED BY PROFESSOR TO GO TO [place] [facility] , REALLY WANTS TO GO  WORRIED ABOUT THIS OCCURRING AGAIN. 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         Assessment/Plan   Problem List Items Addressed This Visit    None  Visit Diagnoses         Codes    Paroxysmal supraventricular " tachycardia (CMS-HCC)    -  Primary I47.10    Relevant Orders    Referral to Cardiology    Grief     F43.21    Panic attacks     F41.0    Anxiety     F41.9    Relevant Medications    escitalopram (Lexapro) 20 mg tablet        DISCUSSED INCREASED LEXAPRO TO 20 MG DAILY,  DISCUSSED USUAL TREATMENT BETA BLOCKERS, DISCUSSED TALK WITH DR Simon ABOUT THIS THOUGHTS.   RETURN 8 WEEKS OR SOONER

## 2025-02-06 PROCEDURE — RXMED WILLOW AMBULATORY MEDICATION CHARGE

## 2025-02-07 ENCOUNTER — PHARMACY VISIT (OUTPATIENT)
Dept: PHARMACY | Facility: CLINIC | Age: 20
End: 2025-02-07
Payer: COMMERCIAL

## 2025-02-07 ENCOUNTER — OFFICE VISIT (OUTPATIENT)
Dept: CARDIOLOGY | Facility: CLINIC | Age: 20
End: 2025-02-07
Payer: COMMERCIAL

## 2025-02-07 VITALS
SYSTOLIC BLOOD PRESSURE: 100 MMHG | OXYGEN SATURATION: 99 % | WEIGHT: 150 LBS | BODY MASS INDEX: 22.15 KG/M2 | DIASTOLIC BLOOD PRESSURE: 72 MMHG | HEART RATE: 72 BPM

## 2025-02-07 DIAGNOSIS — R00.0 TACHYCARDIA: ICD-10-CM

## 2025-02-07 DIAGNOSIS — R00.2 PALPITATIONS: Primary | ICD-10-CM

## 2025-02-07 PROCEDURE — RXOTC WILLOW AMBULATORY OTC CHARGE

## 2025-02-07 PROCEDURE — 93010 ELECTROCARDIOGRAM REPORT: CPT | Performed by: INTERNAL MEDICINE

## 2025-02-07 PROCEDURE — 1036F TOBACCO NON-USER: CPT | Performed by: INTERNAL MEDICINE

## 2025-02-07 PROCEDURE — 93005 ELECTROCARDIOGRAM TRACING: CPT | Performed by: INTERNAL MEDICINE

## 2025-02-07 PROCEDURE — RXMED WILLOW AMBULATORY MEDICATION CHARGE

## 2025-02-07 PROCEDURE — 99213 OFFICE O/P EST LOW 20 MIN: CPT | Mod: 25 | Performed by: INTERNAL MEDICINE

## 2025-02-07 PROCEDURE — 99213 OFFICE O/P EST LOW 20 MIN: CPT | Performed by: INTERNAL MEDICINE

## 2025-02-07 RX ORDER — METOPROLOL TARTRATE 25 MG/1
12.5 TABLET, FILM COATED ORAL 2 TIMES DAILY PRN
Qty: 30 TABLET | Refills: 11 | Status: SHIPPED | OUTPATIENT
Start: 2025-02-07 | End: 2026-02-07

## 2025-02-07 ASSESSMENT — ENCOUNTER SYMPTOMS
SHORTNESS OF BREATH: 0
DIAPHORESIS: 0
DIZZINESS: 0
DEPRESSION: 0
NEAR-SYNCOPE: 0
COUGH: 0
ORTHOPNEA: 0
CLAUDICATION: 0
SYNCOPE: 0
WEIGHT GAIN: 0
MYALGIAS: 0
PND: 0
FEVER: 0
PALPITATIONS: 0
DYSPNEA ON EXERTION: 0
LOSS OF SENSATION IN FEET: 0
WEIGHT LOSS: 0
WEAKNESS: 0
WHEEZING: 0
IRREGULAR HEARTBEAT: 0
OCCASIONAL FEELINGS OF UNSTEADINESS: 0

## 2025-02-07 ASSESSMENT — LIFESTYLE VARIABLES
HOW MANY STANDARD DRINKS CONTAINING ALCOHOL DO YOU HAVE ON A TYPICAL DAY: PATIENT DOES NOT DRINK
HOW OFTEN DO YOU HAVE A DRINK CONTAINING ALCOHOL: NEVER
AUDIT-C TOTAL SCORE: 0
SKIP TO QUESTIONS 9-10: 1
HOW OFTEN DO YOU HAVE SIX OR MORE DRINKS ON ONE OCCASION: NEVER

## 2025-02-07 NOTE — ASSESSMENT & PLAN NOTE
I am skeptical that his symptoms are from SVT. It has not been captured. Cardia mobile and ECGs have shown sinus tachycardia. We went over options ie monica, ranjit, cardiamobile. He is going to continue remote home monitoring. Will give him low dose metoprolol to use as needed

## 2025-02-07 NOTE — PROGRESS NOTES
Subjective      Chief Complaint   Patient presents with    <9>was seen in er /chuck [facility] referred    New Patient Visit     New patient        This is a 19-year-old male with history of SVT and 2 ablations in the past with the most recent being in 2019 at [facility].  I saw him initially in December 2023 he was having episodes of palpitations accompanied by nausea and vomiting.  At the time of the evaluation his episodes were too infrequent to consider an event monitor.  We did get an echocardiogram that was normal.  He was seen in June 2024 after an ER visit for skipped beats.  He was given IV fluids with resolution.  At the time of our last evaluation he was very active without any significant functional limitations.  He was seen in the ER in December for palpitations.  His EKG on arrival showed sinus tachycardia.  He was dismissed without any particular intervention.          Review of Systems   Constitutional: Negative for diaphoresis, fever, weight gain and weight loss.   Eyes:  Negative for visual disturbance.   Cardiovascular:  Negative for chest pain, claudication, dyspnea on exertion, irregular heartbeat, leg swelling, near-syncope, orthopnea, palpitations, paroxysmal nocturnal dyspnea and syncope.   Respiratory:  Negative for cough, shortness of breath and wheezing.    Musculoskeletal:  Negative for muscle weakness and myalgias.   Neurological:  Negative for dizziness and weakness.   All other systems reviewed and are negative.       Past Medical History:   Diagnosis Date    Other conditions influencing health status     Seen by counselor    Personal history of diseases of the skin and subcutaneous tissue 07/14/2021    History of hair disorder    Personal history of other diseases of the circulatory system     History of cardiac arrhythmia    Personal history of other diseases of the respiratory system 03/10/2020    History of sore throat    Personal history of other diseases of the  respiratory system 02/03/2020    History of influenza    Personal history of other infectious and parasitic diseases 01/24/2020    History of viral infection    Personal history of other specified conditions     History of medical problems    Personal history of other specified conditions 01/21/2020    History of fever    Personal history of other specified conditions 07/30/2021    History of epistaxis    Presence of spectacles and contact lenses     Wears glasses        Past Surgical History:   Procedure Laterality Date    OTHER SURGICAL HISTORY  07/30/2021    Cardiac cath His ablation        Social History     Socioeconomic History    Marital status: Single     Spouse name: Not on file    Number of children: Not on file    Years of education: Not on file    Highest education level: Not on file   Occupational History    Not on file   Tobacco Use    Smoking status: Never     Passive exposure: Never    Smokeless tobacco: Never   Vaping Use    Vaping status: Never Used   Substance and Sexual Activity    Alcohol use: Never    Drug use: Never    Sexual activity: Defer   Other Topics Concern    Not on file   Social History Narrative    At [employer], business     Social Drivers of Health     Financial Resource Strain: Not on file   Food Insecurity: Not on file   Transportation Needs: Not on file   Physical Activity: Not on file   Stress: Not on file   Social Connections: Not on file   Intimate Partner Violence: Not on file   Housing Stability: Not on file        Family History   Problem Relation Name Age of Onset    No Known Problems Mother 65     Hypertension Father 86         OBJECTIVE:    Vitals:    02/07/25 1028   BP: 100/72   Pulse: 72   SpO2: 99%        Vitals reviewed.   Constitutional:       Appearance: Normal and healthy appearance. Not in distress.   Pulmonary:      Effort: Pulmonary effort is normal.      Breath sounds: Normal breath sounds.   Cardiovascular:      Normal rate. Regular rhythm. Normal S1.  "Normal S2.       Murmurs: There is no murmur.      No gallop.  No click.   Pulses:     Intact distal pulses.   Edema:     Peripheral edema absent.   Skin:     General: Skin is warm and dry.   Neurological:      General: No focal deficit present.          Lab Review:   Lab Results   Component Value Date     12/28/2024    K 3.5 12/28/2024     12/28/2024    CO2 26 12/28/2024    BUN 11 12/28/2024    CREATININE 1.18 12/28/2024    GLUCOSE 112 (H) 12/28/2024    CALCIUM 10.3 12/28/2024     No results found for: \"CHOL\", \"TRIG\", \"HDL\", \"LDLDIRECT\"    No results found for: \"LDLCALC\"     Tachycardia  I am skeptical that his symptoms are from SVT. It has not been captured. Cardia mobile and ECGs have shown sinus tachycardia. We went over options ie zio, linq, cardiamobile. He is going to continue remote home monitoring. Will give him low dose metoprolol to use as needed       "

## 2025-03-08 ENCOUNTER — OFFICE VISIT (OUTPATIENT)
Dept: URGENT CARE | Age: 20
End: 2025-03-08
Payer: COMMERCIAL

## 2025-03-08 ENCOUNTER — TELEPHONE (OUTPATIENT)
Dept: URGENT CARE | Age: 20
End: 2025-03-08

## 2025-03-08 VITALS
TEMPERATURE: 98.1 F | WEIGHT: 150 LBS | SYSTOLIC BLOOD PRESSURE: 122 MMHG | BODY MASS INDEX: 22.15 KG/M2 | HEART RATE: 104 BPM | DIASTOLIC BLOOD PRESSURE: 76 MMHG | OXYGEN SATURATION: 98 % | RESPIRATION RATE: 21 BRPM

## 2025-03-08 DIAGNOSIS — J34.89 SINUS PAIN: ICD-10-CM

## 2025-03-08 DIAGNOSIS — G44.83 COUGH HEADACHE: ICD-10-CM

## 2025-03-08 DIAGNOSIS — J01.00 ACUTE NON-RECURRENT MAXILLARY SINUSITIS: Primary | ICD-10-CM

## 2025-03-08 LAB
POC RAPID INFLUENZA A: NEGATIVE
POC RAPID INFLUENZA B: NEGATIVE
POC SARS-COV-2 AG BINAX: NORMAL

## 2025-03-08 RX ORDER — AMOXICILLIN AND CLAVULANATE POTASSIUM 875; 125 MG/1; MG/1
875 TABLET, FILM COATED ORAL 2 TIMES DAILY
Qty: 20 TABLET | Refills: 0 | Status: SHIPPED | OUTPATIENT
Start: 2025-03-08 | End: 2025-03-08

## 2025-03-08 RX ORDER — AMOXICILLIN AND CLAVULANATE POTASSIUM 875; 125 MG/1; MG/1
875 TABLET, FILM COATED ORAL 2 TIMES DAILY
Qty: 20 TABLET | Refills: 0 | Status: SHIPPED | OUTPATIENT
Start: 2025-03-08

## 2025-03-08 ASSESSMENT — ENCOUNTER SYMPTOMS
SORE THROAT: 0
SINUS PAIN: 1
HEADACHES: 1
DIARRHEA: 0
RHINORRHEA: 1
CHILLS: 0
SINUS PRESSURE: 1
FEVER: 0
COUGH: 1
FATIGUE: 0
SHORTNESS OF BREATH: 0
VOMITING: 0
ABDOMINAL PAIN: 0
NAUSEA: 0

## 2025-03-08 NOTE — PROGRESS NOTES
Subjective   Patient ID: Kuldip Landis is a 19 y.o. male. They present today with a chief complaint of Nasal Congestion (Nasal congestion coughing body aches x 5 days).    History of Present Illness    History provided by:  Patient  Sinusitis  Pain details:     Location:  Frontal and maxillary    Quality:  Aching, burning and pressure    Severity:  Moderate    Duration:  5 days    Timing:  Constant  Duration:  5 days  Progression:  Worsening  Chronicity:  New  Relieved by:  Nothing  Worsened by:  Lying down  Ineffective treatments:  None tried  Associated symptoms: congestion, cough, headaches, rhinorrhea and sneezing    Associated symptoms: no chest pain, no chills, no ear pain, no fatigue, no fever, no nausea, no shortness of breath, no sore throat and no vomiting        Past Medical History  Allergies as of 03/08/2025    (No Known Allergies)       (Not in a hospital admission)       Past Medical History:   Diagnosis Date    Other conditions influencing health status     Seen by counselor    Personal history of diseases of the skin and subcutaneous tissue 07/14/2021    History of hair disorder    Personal history of other diseases of the circulatory system     History of cardiac arrhythmia    Personal history of other diseases of the respiratory system 03/10/2020    History of sore throat    Personal history of other diseases of the respiratory system 02/03/2020    History of influenza    Personal history of other infectious and parasitic diseases 01/24/2020    History of viral infection    Personal history of other specified conditions     History of medical problems    Personal history of other specified conditions 01/21/2020    History of fever    Personal history of other specified conditions 07/30/2021    History of epistaxis    Presence of spectacles and contact lenses     Wears glasses       Past Surgical History:   Procedure Laterality Date    OTHER SURGICAL HISTORY  07/30/2021    Cardiac cath His  ablation        reports that he has never smoked. He has never been exposed to tobacco smoke. He has never used smokeless tobacco. He reports that he does not drink alcohol and does not use drugs.    Review of Systems  Review of Systems   Constitutional:  Negative for chills, fatigue and fever.   HENT:  Positive for congestion, postnasal drip, rhinorrhea, sinus pressure, sinus pain and sneezing. Negative for ear pain and sore throat.    Respiratory:  Positive for cough. Negative for shortness of breath.    Cardiovascular:  Negative for chest pain.   Gastrointestinal:  Negative for abdominal pain, diarrhea, nausea and vomiting.   Neurological:  Positive for headaches.   All other systems reviewed and are negative.                                 Objective    Vitals:    03/08/25 1418   BP: 122/76   BP Location: Left arm   Patient Position: Sitting   BP Cuff Size: Adult   Pulse: 104   Resp: 21   Temp: 36.7 °C (98.1 °F)   SpO2: 98%   Weight: 68 kg (150 lb)     No LMP for male patient.    Physical Exam  Vitals and nursing note reviewed.   Constitutional:       Appearance: Normal appearance.   HENT:      Head: Normocephalic.      Right Ear: Tympanic membrane normal.      Left Ear: Tympanic membrane normal.      Nose: Nasal tenderness, mucosal edema, congestion and rhinorrhea present.      Right Turbinates: Enlarged and swollen.      Left Turbinates: Enlarged and swollen.      Right Sinus: Maxillary sinus tenderness and frontal sinus tenderness present.      Left Sinus: Maxillary sinus tenderness and frontal sinus tenderness present.      Mouth/Throat:      Mouth: Mucous membranes are moist.   Eyes:      Pupils: Pupils are equal, round, and reactive to light.   Cardiovascular:      Rate and Rhythm: Normal rate and regular rhythm.   Pulmonary:      Effort: Pulmonary effort is normal.      Breath sounds: Normal breath sounds.   Abdominal:      General: Bowel sounds are normal.      Palpations: Abdomen is soft.   Skin:      General: Skin is warm and dry.      Capillary Refill: Capillary refill takes less than 2 seconds.   Neurological:      General: No focal deficit present.      Mental Status: He is alert.   Psychiatric:         Mood and Affect: Mood normal.         Procedures    Point of Care Test & Imaging Results from this visit  Results for orders placed or performed in visit on 03/08/25   POCT Covid-19 Rapid Antigen   Result Value Ref Range    POC LALIT-COV-2 AG  Presumptive negative test for SARS-CoV-2 (no antigen detected)     Presumptive negative test for SARS-CoV-2 (no antigen detected)   POCT Influenza A/B manually resulted   Result Value Ref Range    POC Rapid Influenza A Negative Negative    POC Rapid Influenza B Negative Negative      No results found.    Diagnostic study results (if any) were reviewed by Crystal L Severino, APRN-CNP.    Assessment/Plan   Allergies, medications, history, and pertinent labs/EKGs/Imaging reviewed by Crystal L Severino, APRN-CNP.     Medical Decision Making  Vital signs are stable, afebrile.  Chest clear, heart is regular, belly soft and nontender.  ENT exam as above consistent with acute sinusitis.  At time of discharge patient was clinically well-appearing and HDS for outpatient management. The patient and/or family was educated regarding diagnosis, supportive care, OTC and Rx medications. The patient and/or family was given the opportunity to ask questions prior to discharge.  They verbalized understanding of my discussion of the plans for treatment, expected course, indications to return to  or seek further evaluation in ED, and the need for timely follow up as directed.   They were provided with a work/school excuse if requested.      Orders and Diagnoses  Diagnoses and all orders for this visit:  Acute non-recurrent maxillary sinusitis  -     amoxicillin-pot clavulanate (Augmentin) 875-125 mg tablet; Take 1 tablet (875 mg) by mouth 2 times a day.  Cough headache  -     POCT Covid-19  Rapid Antigen  -     POCT Influenza A/B manually resulted  Sinus pain   Post Nasal Drip:  Claritin or Zyrtec everyday for one week   Humidified air  OTC nasal spray as needed   Mucinex DM  Tylenol/ibuprofen as needed for pain/discomfort      Medical Admin Record      Patient disposition: Home    Electronically signed by Crystal L Severino, APRN-CNP  3:34 PM

## 2025-03-08 NOTE — TELEPHONE ENCOUNTER
Patient Is requesting prescription for amoxicillin-pot clavulanate (Augmentin) 875-125 mg tablet be sent [facility], due to [facility] being closed.   --  [id number] [place] [address]   **  pharmacy only open until 6pm today.

## 2025-03-10 ENCOUNTER — PHARMACY VISIT (OUTPATIENT)
Dept: PHARMACY | Facility: CLINIC | Age: 20
End: 2025-03-10
Payer: COMMERCIAL

## 2025-03-10 PROCEDURE — RXMED WILLOW AMBULATORY MEDICATION CHARGE

## 2025-05-08 PROCEDURE — RXMED WILLOW AMBULATORY MEDICATION CHARGE

## 2025-05-09 ENCOUNTER — PHARMACY VISIT (OUTPATIENT)
Dept: PHARMACY | Facility: CLINIC | Age: 20
End: 2025-05-09
Payer: COMMERCIAL

## 2025-06-08 PROCEDURE — RXMED WILLOW AMBULATORY MEDICATION CHARGE

## 2025-06-11 ENCOUNTER — PHARMACY VISIT (OUTPATIENT)
Dept: PHARMACY | Facility: CLINIC | Age: 20
End: 2025-06-11
Payer: COMMERCIAL

## 2025-07-22 PROCEDURE — RXMED WILLOW AMBULATORY MEDICATION CHARGE

## 2025-07-23 ENCOUNTER — PHARMACY VISIT (OUTPATIENT)
Dept: PHARMACY | Facility: CLINIC | Age: 20
End: 2025-07-23
Payer: COMMERCIAL

## 2025-08-10 DIAGNOSIS — F41.9 ANXIETY: ICD-10-CM

## 2025-08-11 PROCEDURE — RXMED WILLOW AMBULATORY MEDICATION CHARGE

## 2025-08-11 RX ORDER — ESCITALOPRAM OXALATE 20 MG/1
20 TABLET ORAL DAILY
Qty: 90 TABLET | Refills: 1 | Status: SHIPPED | OUTPATIENT
Start: 2025-08-11 | End: 2025-08-14 | Stop reason: SDUPTHER

## 2025-08-12 ENCOUNTER — PHARMACY VISIT (OUTPATIENT)
Dept: PHARMACY | Facility: CLINIC | Age: 20
End: 2025-08-12
Payer: COMMERCIAL

## 2025-08-14 ENCOUNTER — OFFICE VISIT (OUTPATIENT)
Dept: PRIMARY CARE | Facility: CLINIC | Age: 20
End: 2025-08-14
Payer: COMMERCIAL

## 2025-08-14 VITALS
HEIGHT: 69 IN | RESPIRATION RATE: 16 BRPM | HEART RATE: 70 BPM | WEIGHT: 150.2 LBS | BODY MASS INDEX: 22.25 KG/M2 | TEMPERATURE: 98.2 F | SYSTOLIC BLOOD PRESSURE: 122 MMHG | DIASTOLIC BLOOD PRESSURE: 74 MMHG | OXYGEN SATURATION: 100 %

## 2025-08-14 DIAGNOSIS — F41.9 ANXIETY: ICD-10-CM

## 2025-08-14 DIAGNOSIS — F43.21 GRIEF: Primary | ICD-10-CM

## 2025-08-14 PROCEDURE — 99213 OFFICE O/P EST LOW 20 MIN: CPT | Performed by: NURSE PRACTITIONER

## 2025-08-14 PROCEDURE — 1036F TOBACCO NON-USER: CPT | Performed by: NURSE PRACTITIONER

## 2025-08-14 PROCEDURE — 3008F BODY MASS INDEX DOCD: CPT | Performed by: NURSE PRACTITIONER

## 2025-08-14 RX ORDER — ESCITALOPRAM OXALATE 20 MG/1
20 TABLET ORAL DAILY
Qty: 90 TABLET | Refills: 1 | Status: SHIPPED | OUTPATIENT
Start: 2025-08-14 | End: 2026-02-10

## 2025-08-14 ASSESSMENT — PAIN SCALES - GENERAL: PAINLEVEL_OUTOF10: 0-NO PAIN

## 2025-08-14 ASSESSMENT — ENCOUNTER SYMPTOMS: NERVOUS/ANXIOUS: 1

## 2025-08-22 ENCOUNTER — APPOINTMENT (OUTPATIENT)
Dept: PRIMARY CARE | Facility: CLINIC | Age: 20
End: 2025-08-22
Payer: COMMERCIAL